# Patient Record
Sex: FEMALE | Race: WHITE | NOT HISPANIC OR LATINO | Employment: OTHER | URBAN - METROPOLITAN AREA
[De-identification: names, ages, dates, MRNs, and addresses within clinical notes are randomized per-mention and may not be internally consistent; named-entity substitution may affect disease eponyms.]

---

## 2018-06-12 ENCOUNTER — HOSPITAL ENCOUNTER (OUTPATIENT)
Dept: OTHER | Facility: HOSPITAL | Age: 68
Setting detail: SPECIMEN
Discharge: HOME | End: 2018-06-12
Attending: INTERNAL MEDICINE | Admitting: INTERNAL MEDICINE

## 2018-06-12 ENCOUNTER — ON CAMPUS - OUTPATIENT (AMBULATORY)
Dept: URBAN - METROPOLITAN AREA HOSPITAL 2 | Facility: HOSPITAL | Age: 68
End: 2018-06-12
Payer: OTHER GOVERNMENT

## 2018-06-12 ENCOUNTER — OFFICE (AMBULATORY)
Dept: URBAN - METROPOLITAN AREA PATHOLOGY 4 | Facility: PATHOLOGY | Age: 68
End: 2018-06-12
Payer: OTHER GOVERNMENT

## 2018-06-12 VITALS
DIASTOLIC BLOOD PRESSURE: 72 MMHG | WEIGHT: 195 LBS | HEART RATE: 73 BPM | SYSTOLIC BLOOD PRESSURE: 158 MMHG | TEMPERATURE: 97.5 F | RESPIRATION RATE: 15 BRPM | DIASTOLIC BLOOD PRESSURE: 63 MMHG | HEART RATE: 76 BPM | SYSTOLIC BLOOD PRESSURE: 131 MMHG | DIASTOLIC BLOOD PRESSURE: 79 MMHG | SYSTOLIC BLOOD PRESSURE: 148 MMHG | SYSTOLIC BLOOD PRESSURE: 127 MMHG | HEART RATE: 75 BPM | DIASTOLIC BLOOD PRESSURE: 64 MMHG | OXYGEN SATURATION: 100 % | SYSTOLIC BLOOD PRESSURE: 138 MMHG | RESPIRATION RATE: 16 BRPM | SYSTOLIC BLOOD PRESSURE: 124 MMHG | OXYGEN SATURATION: 99 % | OXYGEN SATURATION: 96 % | OXYGEN SATURATION: 88 % | SYSTOLIC BLOOD PRESSURE: 151 MMHG | SYSTOLIC BLOOD PRESSURE: 135 MMHG | DIASTOLIC BLOOD PRESSURE: 69 MMHG | HEART RATE: 74 BPM | HEART RATE: 77 BPM | HEART RATE: 78 BPM | DIASTOLIC BLOOD PRESSURE: 73 MMHG | HEIGHT: 65 IN | SYSTOLIC BLOOD PRESSURE: 143 MMHG | DIASTOLIC BLOOD PRESSURE: 67 MMHG | SYSTOLIC BLOOD PRESSURE: 160 MMHG | OXYGEN SATURATION: 94 % | OXYGEN SATURATION: 98 % | DIASTOLIC BLOOD PRESSURE: 74 MMHG

## 2018-06-12 DIAGNOSIS — D12.2 BENIGN NEOPLASM OF ASCENDING COLON: ICD-10-CM

## 2018-06-12 DIAGNOSIS — R11.0 NAUSEA: ICD-10-CM

## 2018-06-12 DIAGNOSIS — Z12.11 ENCOUNTER FOR SCREENING FOR MALIGNANT NEOPLASM OF COLON: ICD-10-CM

## 2018-06-12 DIAGNOSIS — K21.9 GASTRO-ESOPHAGEAL REFLUX DISEASE WITHOUT ESOPHAGITIS: ICD-10-CM

## 2018-06-12 LAB
GI HISTOLOGY: A. UNSPECIFIED: (no result)
GI HISTOLOGY: PDF REPORT: (no result)

## 2018-06-12 PROCEDURE — 43235 EGD DIAGNOSTIC BRUSH WASH: CPT | Mod: 59 | Performed by: INTERNAL MEDICINE

## 2018-06-12 PROCEDURE — 88305 TISSUE EXAM BY PATHOLOGIST: CPT | Mod: 26 | Performed by: INTERNAL MEDICINE

## 2018-06-12 PROCEDURE — 45385 COLONOSCOPY W/LESION REMOVAL: CPT | Mod: PT | Performed by: INTERNAL MEDICINE

## 2018-06-12 RX ADMIN — PROPOFOL: 10 INJECTION, EMULSION INTRAVENOUS at 08:33

## 2020-02-09 ENCOUNTER — HOSPITAL ENCOUNTER (EMERGENCY)
Facility: HOSPITAL | Age: 70
Discharge: HOME | End: 2020-02-09
Attending: EMERGENCY MEDICINE | Admitting: EMERGENCY MEDICINE

## 2020-02-09 ENCOUNTER — APPOINTMENT (OUTPATIENT)
Dept: GENERAL RADIOLOGY | Facility: HOSPITAL | Age: 70
End: 2020-02-09

## 2020-02-09 VITALS
BODY MASS INDEX: 34.27 KG/M2 | HEIGHT: 65 IN | HEART RATE: 102 BPM | TEMPERATURE: 98.5 F | WEIGHT: 205.69 LBS | RESPIRATION RATE: 19 BRPM | SYSTOLIC BLOOD PRESSURE: 158 MMHG | OXYGEN SATURATION: 94 % | DIASTOLIC BLOOD PRESSURE: 59 MMHG

## 2020-02-09 DIAGNOSIS — J11.1 INFLUENZA: Primary | ICD-10-CM

## 2020-02-09 LAB
ALBUMIN SERPL-MCNC: 3.9 G/DL (ref 3.5–5.2)
ALBUMIN/GLOB SERPL: 1.4 G/DL
ALP SERPL-CCNC: 129 U/L (ref 39–117)
ALT SERPL W P-5'-P-CCNC: 58 U/L (ref 1–33)
ANION GAP SERPL CALCULATED.3IONS-SCNC: 12 MMOL/L (ref 5–15)
AST SERPL-CCNC: 46 U/L (ref 1–32)
BASOPHILS # BLD AUTO: 0.1 10*3/MM3 (ref 0–0.2)
BASOPHILS NFR BLD AUTO: 1.7 % (ref 0–1.5)
BILIRUB SERPL-MCNC: 0.5 MG/DL (ref 0.2–1.2)
BUN BLD-MCNC: 10 MG/DL (ref 8–23)
BUN/CREAT SERPL: 8.9 (ref 7–25)
CALCIUM SPEC-SCNC: 8.8 MG/DL (ref 8.6–10.5)
CHLORIDE SERPL-SCNC: 100 MMOL/L (ref 98–107)
CO2 SERPL-SCNC: 24 MMOL/L (ref 22–29)
CREAT BLD-MCNC: 1.12 MG/DL (ref 0.57–1)
D-LACTATE SERPL-SCNC: 0.9 MMOL/L (ref 0.5–2)
DEPRECATED RDW RBC AUTO: 50.8 FL (ref 37–54)
EOSINOPHIL # BLD AUTO: 0.1 10*3/MM3 (ref 0–0.4)
EOSINOPHIL NFR BLD AUTO: 1.1 % (ref 0.3–6.2)
ERYTHROCYTE [DISTWIDTH] IN BLOOD BY AUTOMATED COUNT: 16.2 % (ref 12.3–15.4)
FLUAV SUBTYP SPEC NAA+PROBE: DETECTED
FLUBV RNA ISLT QL NAA+PROBE: NOT DETECTED
GFR SERPL CREATININE-BSD FRML MDRD: 48 ML/MIN/1.73
GLOBULIN UR ELPH-MCNC: 2.7 GM/DL
GLUCOSE BLD-MCNC: 119 MG/DL (ref 65–99)
HCT VFR BLD AUTO: 36.3 % (ref 34–46.6)
HGB BLD-MCNC: 12.1 G/DL (ref 12–15.9)
LYMPHOCYTES # BLD AUTO: 0.5 10*3/MM3 (ref 0.7–3.1)
LYMPHOCYTES NFR BLD AUTO: 8.9 % (ref 19.6–45.3)
MCH RBC QN AUTO: 29.5 PG (ref 26.6–33)
MCHC RBC AUTO-ENTMCNC: 33.4 G/DL (ref 31.5–35.7)
MCV RBC AUTO: 88.4 FL (ref 79–97)
MONOCYTES # BLD AUTO: 0.6 10*3/MM3 (ref 0.1–0.9)
MONOCYTES NFR BLD AUTO: 10 % (ref 5–12)
NEUTROPHILS # BLD AUTO: 4.3 10*3/MM3 (ref 1.7–7)
NEUTROPHILS NFR BLD AUTO: 78.3 % (ref 42.7–76)
NRBC BLD AUTO-RTO: 0.2 /100 WBC (ref 0–0.2)
PLATELET # BLD AUTO: 93 10*3/MM3 (ref 140–450)
PMV BLD AUTO: 7.4 FL (ref 6–12)
POTASSIUM BLD-SCNC: 4.2 MMOL/L (ref 3.5–5.2)
PROT SERPL-MCNC: 6.6 G/DL (ref 6–8.5)
RBC # BLD AUTO: 4.11 10*6/MM3 (ref 3.77–5.28)
RBC MORPH BLD: NORMAL
SMALL PLATELETS BLD QL SMEAR: ADEQUATE
SODIUM BLD-SCNC: 136 MMOL/L (ref 136–145)
WBC MORPH BLD: NORMAL
WBC NRBC COR # BLD: 5.5 10*3/MM3 (ref 3.4–10.8)

## 2020-02-09 PROCEDURE — 80053 COMPREHEN METABOLIC PANEL: CPT | Performed by: EMERGENCY MEDICINE

## 2020-02-09 PROCEDURE — 99285 EMERGENCY DEPT VISIT HI MDM: CPT

## 2020-02-09 PROCEDURE — 83605 ASSAY OF LACTIC ACID: CPT

## 2020-02-09 PROCEDURE — 71045 X-RAY EXAM CHEST 1 VIEW: CPT

## 2020-02-09 PROCEDURE — 87040 BLOOD CULTURE FOR BACTERIA: CPT | Performed by: EMERGENCY MEDICINE

## 2020-02-09 PROCEDURE — 36415 COLL VENOUS BLD VENIPUNCTURE: CPT

## 2020-02-09 PROCEDURE — 85025 COMPLETE CBC W/AUTO DIFF WBC: CPT | Performed by: EMERGENCY MEDICINE

## 2020-02-09 PROCEDURE — 87502 INFLUENZA DNA AMP PROBE: CPT | Performed by: EMERGENCY MEDICINE

## 2020-02-09 PROCEDURE — 85007 BL SMEAR W/DIFF WBC COUNT: CPT | Performed by: EMERGENCY MEDICINE

## 2020-02-09 RX ORDER — FEBUXOSTAT 80 MG/1
TABLET, FILM COATED ORAL DAILY
COMMUNITY

## 2020-02-09 RX ORDER — EZETIMIBE 10 MG/1
10 TABLET ORAL DAILY
COMMUNITY

## 2020-02-09 RX ORDER — BUMETANIDE 2 MG/1
2 TABLET ORAL DAILY
COMMUNITY

## 2020-02-09 RX ORDER — ALLOPURINOL 300 MG/1
300 TABLET ORAL DAILY
COMMUNITY

## 2020-02-09 RX ORDER — GLIPIZIDE 10 MG/1
20 TABLET ORAL
COMMUNITY

## 2020-02-09 RX ORDER — LINEZOLID 600 MG/1
600 TABLET, FILM COATED ORAL 2 TIMES DAILY
COMMUNITY
Start: 2020-01-22 | End: 2020-06-10

## 2020-02-09 RX ORDER — SODIUM CHLORIDE 0.9 % (FLUSH) 0.9 %
10 SYRINGE (ML) INJECTION AS NEEDED
Status: DISCONTINUED | OUTPATIENT
Start: 2020-02-09 | End: 2020-02-09 | Stop reason: HOSPADM

## 2020-02-09 RX ORDER — ASPIRIN 81 MG/1
81 TABLET ORAL 3 TIMES WEEKLY
COMMUNITY

## 2020-02-09 RX ORDER — ACETAMINOPHEN 500 MG
1000 TABLET ORAL ONCE
Status: COMPLETED | OUTPATIENT
Start: 2020-02-09 | End: 2020-02-09

## 2020-02-09 RX ORDER — RABEPRAZOLE SODIUM 20 MG/1
20 TABLET, DELAYED RELEASE ORAL DAILY
COMMUNITY

## 2020-02-09 RX ORDER — ONDANSETRON 4 MG/1
4 TABLET, ORALLY DISINTEGRATING ORAL ONCE
Status: COMPLETED | OUTPATIENT
Start: 2020-02-09 | End: 2020-02-09

## 2020-02-09 RX ORDER — OSELTAMIVIR PHOSPHATE 75 MG/1
75 CAPSULE ORAL 2 TIMES DAILY
Qty: 10 CAPSULE | Refills: 0 | Status: SHIPPED | OUTPATIENT
Start: 2020-02-09 | End: 2020-06-10

## 2020-02-09 RX ORDER — FOLIC ACID 1 MG/1
1 TABLET ORAL DAILY
COMMUNITY

## 2020-02-09 RX ORDER — METOPROLOL TARTRATE 50 MG/1
50 TABLET, FILM COATED ORAL 2 TIMES DAILY
COMMUNITY

## 2020-02-09 RX ORDER — ROPINIROLE 2 MG/1
2 TABLET, FILM COATED ORAL 3 TIMES DAILY
COMMUNITY

## 2020-02-09 RX ADMIN — ONDANSETRON 4 MG: 4 TABLET, ORALLY DISINTEGRATING ORAL at 09:41

## 2020-02-09 RX ADMIN — SODIUM CHLORIDE 1000 ML: 900 INJECTION, SOLUTION INTRAVENOUS at 09:25

## 2020-02-09 RX ADMIN — Medication 10 ML: at 09:25

## 2020-02-09 RX ADMIN — ACETAMINOPHEN 1000 MG: 500 TABLET, FILM COATED ORAL at 09:41

## 2020-02-09 NOTE — DISCHARGE INSTRUCTIONS
Return for increased pain fever vomiting, shortness of air or any other concerns. See your doctor this week for follow up of blood cultures and reexamination as well as borderline elevated liver enzymes.  Rest, drink plenty of fluids

## 2020-02-09 NOTE — ED PROVIDER NOTES
Subjective   History of Present Illness  Fever, cough and congestion   69-year-old female complains of achiness fever cough over the last 2 days.  Cough is nonproductive.  She has had headache and muscle aches.  States she has had a slight scratchy sore throat.  She denies shortness of breath she has had some nausea.  She states she has had a few loose stools.  She reports no known ill contacts.  States she has been on Zyvox for a staph infection on her face that has now cleared up.  She denies abdominal pain or dysuria  Review of Systems   Constitutional: Positive for fever.   HENT: Positive for congestion and sore throat.    Eyes: Negative.    Respiratory: Positive for cough. Negative for shortness of breath.    Cardiovascular: Negative.    Gastrointestinal: Positive for abdominal pain, diarrhea and nausea.   Genitourinary: Negative.    Musculoskeletal: Positive for myalgias.   Skin: Negative.    Neurological: Positive for headaches. Negative for weakness and numbness.   Hematological: Negative.    Psychiatric/Behavioral: Negative.        Past Medical History:   Diagnosis Date   • Gout    • Hypertension        No Known Allergies    Past Surgical History:   Procedure Laterality Date   • CHOLECYSTECTOMY     • HYSTERECTOMY     • TONSILLECTOMY         History reviewed. No pertinent family history.    Social History     Socioeconomic History   • Marital status:      Spouse name: Not on file   • Number of children: Not on file   • Years of education: Not on file   • Highest education level: Not on file   Tobacco Use   • Smoking status: Never Smoker   Substance and Sexual Activity   • Alcohol use: Never     Frequency: Never   • Drug use: Never     Prior to Admission medications    Medication Sig Start Date End Date Taking? Authorizing Provider   allopurinol (ZYLOPRIM) 300 MG tablet Take 300 mg by mouth Daily.   Yes Provider, MD Attila   aspirin 81 MG EC tablet Take 81 mg by mouth 3 (Three) Times a Week.   Yes  "Attila Pope MD   bumetanide (BUMEX) 2 MG tablet Take 2 mg by mouth Daily.   Yes Attila Pope MD   ezetimibe (ZETIA) 10 MG tablet Take 10 mg by mouth Daily.   Yes Attila Pope MD   febuxostat (ULORIC) 80 MG tablet tablet Take  by mouth Daily.   Yes Attila Pope MD   folic acid (FOLVITE) 1 MG tablet Take 1 mg by mouth 2 (Two) Times a Day.   Yes Attila Pope MD   glipizide (GLUCOTROL) 10 MG tablet Take 20 mg by mouth 2 (Two) Times a Day Before Meals.   Yes Attila Pope MD   linezolid (ZYVOX) 600 MG tablet Take 600 mg by mouth 2 (Two) Times a Day. 1/22/20  Yes Attila Pope MD   metoprolol tartrate (LOPRESSOR) 50 MG tablet Take 50 mg by mouth 2 (Two) Times a Day.   Yes Attila Pope MD   RABEprazole (ACIPHEX) 20 MG EC tablet Take 20 mg by mouth Daily.   Yes Attila Pope MD   rOPINIRole (REQUIP) 2 MG tablet Take 2 mg by mouth 3 (Three) Times a Day.   Yes Attila Pope MD       BP (!) 182/75   Pulse 107   Temp 100 °F (37.8 °C) (Oral)   Resp 20   Ht 165.1 cm (65\")   Wt 93.3 kg (205 lb 11 oz)   SpO2 93%   BMI 34.23 kg/m²       Objective   Physical Exam  General: Well-developed well-appearing, no acute distress, alert and appropriate  Eyes: Pupils round and reactive, sclera nonicteric  HEENT: Mucous membranes somewhat dry, no mucosal swelling, tympanic membranes clear bilaterally  Neck: Supple, no nuchal rigidity, no lymphadenopathy  Respirations: Respirations nonlabored, equal breath sounds bilaterally, clear lungs  Heart regular rate and rhythm, no murmurs rubs or gallops,   Abdomen soft nontender nondistended, no hepatosplenomegaly, no hernia, no mass, normal bowel sounds, no CVA tenderness  Extremities no clubbing cyanosis or edema, calves are symmetric and nontender  Neuro cranial nerves grossly intact, no focal limb deficits  Psych oriented, pleasant affect  Skin no rash, brisk cap refill  Procedures           ED Course  ED " Course as of Feb 09 1026   Sun Feb 09, 2020   1019 POC Lactate [SH]   1020 POC Lactate [SH]      ED Course User Index  [SH] Osman Irving MD            Results for orders placed or performed during the hospital encounter of 02/09/20   Influenza Antigen, Rapid - Swab, Nasopharynx   Result Value Ref Range    Influenza A PCR Detected (A) Not Detected    Influenza B PCR Not Detected Not Detected   Comprehensive Metabolic Panel   Result Value Ref Range    Glucose 119 (H) 65 - 99 mg/dL    BUN 10 8 - 23 mg/dL    Creatinine 1.12 (H) 0.57 - 1.00 mg/dL    Sodium 136 136 - 145 mmol/L    Potassium 4.2 3.5 - 5.2 mmol/L    Chloride 100 98 - 107 mmol/L    CO2 24.0 22.0 - 29.0 mmol/L    Calcium 8.8 8.6 - 10.5 mg/dL    Total Protein 6.6 6.0 - 8.5 g/dL    Albumin 3.90 3.50 - 5.20 g/dL    ALT (SGPT) 58 (H) 1 - 33 U/L    AST (SGOT) 46 (H) 1 - 32 U/L    Alkaline Phosphatase 129 (H) 39 - 117 U/L    Total Bilirubin 0.5 0.2 - 1.2 mg/dL    eGFR Non African Amer 48 (L) >60 mL/min/1.73    Globulin 2.7 gm/dL    A/G Ratio 1.4 g/dL    BUN/Creatinine Ratio 8.9 7.0 - 25.0    Anion Gap 12.0 5.0 - 15.0 mmol/L   CBC Auto Differential   Result Value Ref Range    WBC 5.50 3.40 - 10.80 10*3/mm3    RBC 4.11 3.77 - 5.28 10*6/mm3    Hemoglobin 12.1 12.0 - 15.9 g/dL    Hematocrit 36.3 34.0 - 46.6 %    MCV 88.4 79.0 - 97.0 fL    MCH 29.5 26.6 - 33.0 pg    MCHC 33.4 31.5 - 35.7 g/dL    RDW 16.2 (H) 12.3 - 15.4 %    RDW-SD 50.8 37.0 - 54.0 fl    MPV 7.4 6.0 - 12.0 fL    Platelets 93 (L) 140 - 450 10*3/mm3    Neutrophil % 78.3 (H) 42.7 - 76.0 %    Lymphocyte % 8.9 (L) 19.6 - 45.3 %    Monocyte % 10.0 5.0 - 12.0 %    Eosinophil % 1.1 0.3 - 6.2 %    Basophil % 1.7 (H) 0.0 - 1.5 %    Neutrophils, Absolute 4.30 1.70 - 7.00 10*3/mm3    Lymphocytes, Absolute 0.50 (L) 0.70 - 3.10 10*3/mm3    Monocytes, Absolute 0.60 0.10 - 0.90 10*3/mm3    Eosinophils, Absolute 0.10 0.00 - 0.40 10*3/mm3    Basophils, Absolute 0.10 0.00 - 0.20 10*3/mm3    nRBC 0.2 0.0 - 0.2 /100  WBC   Scan Slide   Result Value Ref Range    RBC Morphology Normal Normal    WBC Morphology Normal Normal    Platelet Estimate Adequate Normal   POC Lactate   Result Value Ref Range    Lactate 0.9 0.5 - 2.0 mmol/L     Xr Chest 1 View    Result Date: 2/9/2020   1. There is no obvious pneumonia or congestive change.   Electronically Signed By-Kimo Kee On:2/9/2020 9:46 AM This report was finalized on [id number] by  Kimo Kee, .                                          MDM  Patient presents with fever and myalgias.  She was positive for influenza.  Lactic acid is normal.  She did appear somewhat dehydrated was given some IV fluids.  She is not in any respiratory distress.  She does have some borderline elevated liver enzymes of unknown chronicity.  She is to follow-up with her primary care physician and given warning signs for return.  Notably she has a benign abdominal examination with no signs of peritonitis or acute abdomen.  She is prescribed Tamiflu for influenza.  Final diagnoses:   Influenza            Osman Irving MD  02/09/20 1027

## 2020-02-14 LAB
BACTERIA SPEC AEROBE CULT: NORMAL
BACTERIA SPEC AEROBE CULT: NORMAL

## 2020-06-10 ENCOUNTER — HOSPITAL ENCOUNTER (OUTPATIENT)
Facility: HOSPITAL | Age: 70
Setting detail: OBSERVATION
Discharge: HOME | End: 2020-06-14
Attending: EMERGENCY MEDICINE | Admitting: HOSPITALIST

## 2020-06-10 DIAGNOSIS — L02.214 ABSCESS OF GROIN, LEFT: Primary | ICD-10-CM

## 2020-06-10 LAB
ALBUMIN SERPL-MCNC: 4 G/DL (ref 3.5–5.2)
ALBUMIN/GLOB SERPL: 1.2 G/DL
ALP SERPL-CCNC: 155 U/L (ref 39–117)
ALT SERPL W P-5'-P-CCNC: 37 U/L (ref 1–33)
ANION GAP SERPL CALCULATED.3IONS-SCNC: 12 MMOL/L (ref 5–15)
AST SERPL-CCNC: 27 U/L (ref 1–32)
BACTERIA UR QL AUTO: ABNORMAL /HPF
BASOPHILS # BLD AUTO: 0.1 10*3/MM3 (ref 0–0.2)
BASOPHILS NFR BLD AUTO: 1.1 % (ref 0–1.5)
BILIRUB SERPL-MCNC: 0.4 MG/DL (ref 0.2–1.2)
BILIRUB UR QL STRIP: NEGATIVE
BILIRUB UR QL STRIP: NEGATIVE
BUN BLD-MCNC: 25 MG/DL (ref 8–23)
BUN BLD-MCNC: ABNORMAL MG/DL
BUN/CREAT SERPL: ABNORMAL
CALCIUM SPEC-SCNC: 9.3 MG/DL (ref 8.6–10.5)
CHLORIDE SERPL-SCNC: 102 MMOL/L (ref 98–107)
CLARITY UR: ABNORMAL
CLARITY UR: CLEAR
CO2 SERPL-SCNC: 22 MMOL/L (ref 22–29)
COLOR UR: ABNORMAL
COLOR UR: ABNORMAL
CREAT BLD-MCNC: 1.46 MG/DL (ref 0.57–1)
D-LACTATE SERPL-SCNC: 0.8 MMOL/L (ref 0.5–2)
DEPRECATED RDW RBC AUTO: 46.8 FL (ref 37–54)
EOSINOPHIL # BLD AUTO: 0.3 10*3/MM3 (ref 0–0.4)
EOSINOPHIL NFR BLD AUTO: 2.5 % (ref 0.3–6.2)
ERYTHROCYTE [DISTWIDTH] IN BLOOD BY AUTOMATED COUNT: 15.1 % (ref 12.3–15.4)
GFR SERPL CREATININE-BSD FRML MDRD: 36 ML/MIN/1.73
GLOBULIN UR ELPH-MCNC: 3.3 GM/DL
GLUCOSE BLD-MCNC: 111 MG/DL (ref 65–99)
GLUCOSE BLDC GLUCOMTR-MCNC: 148 MG/DL (ref 70–105)
GLUCOSE BLDC GLUCOMTR-MCNC: 190 MG/DL (ref 70–105)
GLUCOSE BLDC GLUCOMTR-MCNC: 88 MG/DL (ref 70–105)
GLUCOSE BLDC GLUCOMTR-MCNC: 99 MG/DL (ref 70–105)
GLUCOSE UR STRIP-MCNC: NEGATIVE MG/DL
GLUCOSE UR STRIP-MCNC: NEGATIVE MG/DL
HCT VFR BLD AUTO: 38.1 % (ref 34–46.6)
HGB BLD-MCNC: 12.6 G/DL (ref 12–15.9)
HGB UR QL STRIP.AUTO: ABNORMAL
HGB UR QL STRIP.AUTO: NEGATIVE
HOLD SPECIMEN: NORMAL
HOLD SPECIMEN: NORMAL
HYALINE CASTS UR QL AUTO: ABNORMAL /LPF
KETONES UR QL STRIP: ABNORMAL
KETONES UR QL STRIP: NEGATIVE
LEUKOCYTE ESTERASE UR QL STRIP.AUTO: ABNORMAL
LEUKOCYTE ESTERASE UR QL STRIP.AUTO: NEGATIVE
LYMPHOCYTES # BLD AUTO: 1.1 10*3/MM3 (ref 0.7–3.1)
LYMPHOCYTES NFR BLD AUTO: 8.2 % (ref 19.6–45.3)
MCH RBC QN AUTO: 29.4 PG (ref 26.6–33)
MCHC RBC AUTO-ENTMCNC: 33.1 G/DL (ref 31.5–35.7)
MCV RBC AUTO: 88.9 FL (ref 79–97)
MONOCYTES # BLD AUTO: 0.7 10*3/MM3 (ref 0.1–0.9)
MONOCYTES NFR BLD AUTO: 5.2 % (ref 5–12)
NEUTROPHILS # BLD AUTO: 10.8 10*3/MM3 (ref 1.7–7)
NEUTROPHILS NFR BLD AUTO: 83 % (ref 42.7–76)
NITRITE UR QL STRIP: NEGATIVE
NITRITE UR QL STRIP: NEGATIVE
NRBC BLD AUTO-RTO: 0 /100 WBC (ref 0–0.2)
PH UR STRIP.AUTO: 5.5 [PH] (ref 5–8)
PH UR STRIP.AUTO: 5.5 [PH] (ref 5–8)
PLATELET # BLD AUTO: 228 10*3/MM3 (ref 140–450)
PMV BLD AUTO: 8.7 FL (ref 6–12)
POTASSIUM BLD-SCNC: 3.7 MMOL/L (ref 3.5–5.2)
PROT SERPL-MCNC: 7.3 G/DL (ref 6–8.5)
PROT UR QL STRIP: ABNORMAL
PROT UR QL STRIP: ABNORMAL
RBC # BLD AUTO: 4.28 10*6/MM3 (ref 3.77–5.28)
RBC # UR: ABNORMAL /HPF
REF LAB TEST METHOD: ABNORMAL
SARS-COV-2 RNA PNL SPEC NAA+PROBE: NOT DETECTED
SODIUM BLD-SCNC: 136 MMOL/L (ref 136–145)
SP GR UR STRIP: 1.03 (ref 1–1.03)
SP GR UR STRIP: >=1.03 (ref 1–1.03)
SQUAMOUS #/AREA URNS HPF: ABNORMAL /HPF
UROBILINOGEN UR QL STRIP: ABNORMAL
UROBILINOGEN UR QL STRIP: ABNORMAL
WBC NRBC COR # BLD: 13 10*3/MM3 (ref 3.4–10.8)
WBC UR QL AUTO: ABNORMAL /HPF
WHOLE BLOOD HOLD SPECIMEN: NORMAL
WHOLE BLOOD HOLD SPECIMEN: NORMAL

## 2020-06-10 PROCEDURE — 85025 COMPLETE CBC W/AUTO DIFF WBC: CPT | Performed by: EMERGENCY MEDICINE

## 2020-06-10 PROCEDURE — P9612 CATHETERIZE FOR URINE SPEC: HCPCS

## 2020-06-10 PROCEDURE — 87070 CULTURE OTHR SPECIMN AEROBIC: CPT | Performed by: EMERGENCY MEDICINE

## 2020-06-10 PROCEDURE — 25010000003 AMPICILLIN-SULBACTAM PER 1.5 G: Performed by: NURSE PRACTITIONER

## 2020-06-10 PROCEDURE — 83036 HEMOGLOBIN GLYCOSYLATED A1C: CPT | Performed by: NURSE PRACTITIONER

## 2020-06-10 PROCEDURE — 87086 URINE CULTURE/COLONY COUNT: CPT | Performed by: EMERGENCY MEDICINE

## 2020-06-10 PROCEDURE — 87147 CULTURE TYPE IMMUNOLOGIC: CPT | Performed by: EMERGENCY MEDICINE

## 2020-06-10 PROCEDURE — 99220 PR INITIAL OBSERVATION CARE/DAY 70 MINUTES: CPT | Performed by: HOSPITALIST

## 2020-06-10 PROCEDURE — 96361 HYDRATE IV INFUSION ADD-ON: CPT

## 2020-06-10 PROCEDURE — 87040 BLOOD CULTURE FOR BACTERIA: CPT | Performed by: EMERGENCY MEDICINE

## 2020-06-10 PROCEDURE — 87186 SC STD MICRODIL/AGAR DIL: CPT | Performed by: EMERGENCY MEDICINE

## 2020-06-10 PROCEDURE — 87635 SARS-COV-2 COVID-19 AMP PRB: CPT | Performed by: EMERGENCY MEDICINE

## 2020-06-10 PROCEDURE — 63710000001 INSULIN LISPRO (HUMAN) PER 5 UNITS: Performed by: NURSE PRACTITIONER

## 2020-06-10 PROCEDURE — 36415 COLL VENOUS BLD VENIPUNCTURE: CPT

## 2020-06-10 PROCEDURE — G0378 HOSPITAL OBSERVATION PER HR: HCPCS

## 2020-06-10 PROCEDURE — 99284 EMERGENCY DEPT VISIT MOD MDM: CPT

## 2020-06-10 PROCEDURE — 96365 THER/PROPH/DIAG IV INF INIT: CPT

## 2020-06-10 PROCEDURE — 82962 GLUCOSE BLOOD TEST: CPT

## 2020-06-10 PROCEDURE — 80053 COMPREHEN METABOLIC PANEL: CPT | Performed by: EMERGENCY MEDICINE

## 2020-06-10 PROCEDURE — 96367 TX/PROPH/DG ADDL SEQ IV INF: CPT

## 2020-06-10 PROCEDURE — 96366 THER/PROPH/DIAG IV INF ADDON: CPT

## 2020-06-10 PROCEDURE — 87205 SMEAR GRAM STAIN: CPT | Performed by: EMERGENCY MEDICINE

## 2020-06-10 PROCEDURE — 83605 ASSAY OF LACTIC ACID: CPT

## 2020-06-10 PROCEDURE — 25010000002 VANCOMYCIN 10 G RECONSTITUTED SOLUTION: Performed by: EMERGENCY MEDICINE

## 2020-06-10 PROCEDURE — 81001 URINALYSIS AUTO W/SCOPE: CPT | Performed by: EMERGENCY MEDICINE

## 2020-06-10 PROCEDURE — 81003 URINALYSIS AUTO W/O SCOPE: CPT | Performed by: EMERGENCY MEDICINE

## 2020-06-10 RX ORDER — NICOTINE POLACRILEX 4 MG
15 LOZENGE BUCCAL
Status: DISCONTINUED | OUTPATIENT
Start: 2020-06-10 | End: 2020-06-14 | Stop reason: HOSPADM

## 2020-06-10 RX ORDER — SODIUM CHLORIDE 9 MG/ML
100 INJECTION, SOLUTION INTRAVENOUS CONTINUOUS
Status: DISCONTINUED | OUTPATIENT
Start: 2020-06-10 | End: 2020-06-14 | Stop reason: HOSPADM

## 2020-06-10 RX ORDER — SODIUM CHLORIDE 0.9 % (FLUSH) 0.9 %
10 SYRINGE (ML) INJECTION EVERY 12 HOURS SCHEDULED
Status: DISCONTINUED | OUTPATIENT
Start: 2020-06-10 | End: 2020-06-14 | Stop reason: HOSPADM

## 2020-06-10 RX ORDER — BUMETANIDE 1 MG/1
2 TABLET ORAL DAILY
Status: DISCONTINUED | OUTPATIENT
Start: 2020-06-10 | End: 2020-06-14 | Stop reason: HOSPADM

## 2020-06-10 RX ORDER — FOLIC ACID 1 MG/1
1 TABLET ORAL DAILY
Status: DISCONTINUED | OUTPATIENT
Start: 2020-06-10 | End: 2020-06-14 | Stop reason: HOSPADM

## 2020-06-10 RX ORDER — SODIUM CHLORIDE 0.9 % (FLUSH) 0.9 %
10 SYRINGE (ML) INJECTION AS NEEDED
Status: DISCONTINUED | OUTPATIENT
Start: 2020-06-10 | End: 2020-06-14 | Stop reason: HOSPADM

## 2020-06-10 RX ORDER — NITROGLYCERIN 0.4 MG/1
0.4 TABLET SUBLINGUAL
Status: DISCONTINUED | OUTPATIENT
Start: 2020-06-10 | End: 2020-06-14 | Stop reason: HOSPADM

## 2020-06-10 RX ORDER — ACETAMINOPHEN 650 MG/1
650 SUPPOSITORY RECTAL EVERY 4 HOURS PRN
Status: DISCONTINUED | OUTPATIENT
Start: 2020-06-10 | End: 2020-06-14 | Stop reason: HOSPADM

## 2020-06-10 RX ORDER — METOPROLOL TARTRATE 50 MG/1
50 TABLET, FILM COATED ORAL EVERY 12 HOURS SCHEDULED
Status: DISCONTINUED | OUTPATIENT
Start: 2020-06-10 | End: 2020-06-14 | Stop reason: HOSPADM

## 2020-06-10 RX ORDER — FEBUXOSTAT 40 MG/1
80 TABLET, FILM COATED ORAL DAILY
Status: DISCONTINUED | OUTPATIENT
Start: 2020-06-10 | End: 2020-06-14 | Stop reason: HOSPADM

## 2020-06-10 RX ORDER — CHOLECALCIFEROL (VITAMIN D3) 125 MCG
5 CAPSULE ORAL NIGHTLY PRN
Status: DISCONTINUED | OUTPATIENT
Start: 2020-06-10 | End: 2020-06-14 | Stop reason: HOSPADM

## 2020-06-10 RX ORDER — PANTOPRAZOLE SODIUM 40 MG/1
40 TABLET, DELAYED RELEASE ORAL EVERY MORNING
Status: DISCONTINUED | OUTPATIENT
Start: 2020-06-10 | End: 2020-06-14 | Stop reason: HOSPADM

## 2020-06-10 RX ORDER — ONDANSETRON 2 MG/ML
4 INJECTION INTRAMUSCULAR; INTRAVENOUS EVERY 6 HOURS PRN
Status: DISCONTINUED | OUTPATIENT
Start: 2020-06-10 | End: 2020-06-14 | Stop reason: HOSPADM

## 2020-06-10 RX ORDER — ROPINIROLE 1 MG/1
2 TABLET, FILM COATED ORAL 3 TIMES DAILY
Status: DISCONTINUED | OUTPATIENT
Start: 2020-06-10 | End: 2020-06-14 | Stop reason: HOSPADM

## 2020-06-10 RX ORDER — VANCOMYCIN HYDROCHLORIDE
20 ONCE
Status: COMPLETED | OUTPATIENT
Start: 2020-06-10 | End: 2020-06-10

## 2020-06-10 RX ORDER — ACETAMINOPHEN 325 MG/1
650 TABLET ORAL EVERY 4 HOURS PRN
Status: DISCONTINUED | OUTPATIENT
Start: 2020-06-10 | End: 2020-06-14 | Stop reason: HOSPADM

## 2020-06-10 RX ORDER — GLIPIZIDE 5 MG/1
20 TABLET ORAL
Status: DISCONTINUED | OUTPATIENT
Start: 2020-06-10 | End: 2020-06-14 | Stop reason: HOSPADM

## 2020-06-10 RX ORDER — ASPIRIN 81 MG/1
81 TABLET ORAL 3 TIMES WEEKLY
Status: DISCONTINUED | OUTPATIENT
Start: 2020-06-10 | End: 2020-06-14 | Stop reason: HOSPADM

## 2020-06-10 RX ORDER — DEXTROSE MONOHYDRATE 25 G/50ML
25 INJECTION, SOLUTION INTRAVENOUS
Status: DISCONTINUED | OUTPATIENT
Start: 2020-06-10 | End: 2020-06-14 | Stop reason: HOSPADM

## 2020-06-10 RX ORDER — ACETAMINOPHEN 160 MG/5ML
650 SOLUTION ORAL EVERY 4 HOURS PRN
Status: DISCONTINUED | OUTPATIENT
Start: 2020-06-10 | End: 2020-06-14 | Stop reason: HOSPADM

## 2020-06-10 RX ORDER — ALLOPURINOL 300 MG/1
300 TABLET ORAL DAILY
Status: DISCONTINUED | OUTPATIENT
Start: 2020-06-10 | End: 2020-06-14 | Stop reason: HOSPADM

## 2020-06-10 RX ORDER — ONDANSETRON 4 MG/1
4 TABLET, FILM COATED ORAL EVERY 6 HOURS PRN
Status: DISCONTINUED | OUTPATIENT
Start: 2020-06-10 | End: 2020-06-14 | Stop reason: HOSPADM

## 2020-06-10 RX ADMIN — SODIUM CHLORIDE 100 ML/HR: 900 INJECTION, SOLUTION INTRAVENOUS at 04:44

## 2020-06-10 RX ADMIN — AMPICILLIN SODIUM AND SULBACTAM SODIUM 1.5 G: 1; .5 INJECTION, POWDER, FOR SOLUTION INTRAMUSCULAR; INTRAVENOUS at 20:43

## 2020-06-10 RX ADMIN — ACETAMINOPHEN 650 MG: 325 TABLET, FILM COATED ORAL at 20:45

## 2020-06-10 RX ADMIN — PANTOPRAZOLE SODIUM 40 MG: 40 TABLET, DELAYED RELEASE ORAL at 20:45

## 2020-06-10 RX ADMIN — ALLOPURINOL 300 MG: 300 TABLET ORAL at 08:01

## 2020-06-10 RX ADMIN — AMPICILLIN SODIUM AND SULBACTAM SODIUM 1.5 G: 1; .5 INJECTION, POWDER, FOR SOLUTION INTRAMUSCULAR; INTRAVENOUS at 07:56

## 2020-06-10 RX ADMIN — GLIPIZIDE 20 MG: 5 TABLET ORAL at 17:45

## 2020-06-10 RX ADMIN — Medication 5000 UNITS: at 20:45

## 2020-06-10 RX ADMIN — Medication 10 ML: at 20:45

## 2020-06-10 RX ADMIN — METOPROLOL TARTRATE 50 MG: 50 TABLET, FILM COATED ORAL at 20:45

## 2020-06-10 RX ADMIN — BUMETANIDE 2 MG: 1 TABLET ORAL at 08:01

## 2020-06-10 RX ADMIN — ASPIRIN 81 MG: 81 TABLET, COATED ORAL at 08:01

## 2020-06-10 RX ADMIN — INSULIN LISPRO 2 UNITS: 100 INJECTION, SOLUTION INTRAVENOUS; SUBCUTANEOUS at 11:56

## 2020-06-10 RX ADMIN — VANCOMYCIN HYDROCHLORIDE 1500 MG: 10 INJECTION, POWDER, LYOPHILIZED, FOR SOLUTION INTRAVENOUS at 03:17

## 2020-06-10 RX ADMIN — ROPINIROLE 2 MG: 1 TABLET, FILM COATED ORAL at 20:46

## 2020-06-10 RX ADMIN — FEBUXOSTAT 80 MG: 40 TABLET ORAL at 20:46

## 2020-06-10 RX ADMIN — ROPINIROLE 2 MG: 1 TABLET, FILM COATED ORAL at 15:36

## 2020-06-10 RX ADMIN — SODIUM CHLORIDE 100 ML/HR: 900 INJECTION, SOLUTION INTRAVENOUS at 20:44

## 2020-06-10 RX ADMIN — METOPROLOL TARTRATE 50 MG: 50 TABLET, FILM COATED ORAL at 08:01

## 2020-06-10 RX ADMIN — FOLIC ACID 1 MG: 1 TABLET ORAL at 08:01

## 2020-06-10 RX ADMIN — AMPICILLIN SODIUM AND SULBACTAM SODIUM 1.5 G: 1; .5 INJECTION, POWDER, FOR SOLUTION INTRAMUSCULAR; INTRAVENOUS at 12:00

## 2020-06-10 RX ADMIN — GLIPIZIDE 20 MG: 5 TABLET ORAL at 08:01

## 2020-06-10 RX ADMIN — ACETAMINOPHEN 650 MG: 325 TABLET, FILM COATED ORAL at 08:01

## 2020-06-10 NOTE — PLAN OF CARE
No complaints. Pt states she is feeling better and swelling and pain are much better today. Will continue to monitor.   Problem: Patient Care Overview  Goal: Plan of Care Review  Outcome: Ongoing (interventions implemented as appropriate)

## 2020-06-10 NOTE — PLAN OF CARE
Problem: Patient Care Overview  Goal: Plan of Care Review  Outcome: Ongoing (interventions implemented as appropriate)  Flowsheets (Taken 6/10/2020 8590)  Progress: no change  Plan of Care Reviewed With: patient  Outcome Summary: Patient resting comfortably

## 2020-06-10 NOTE — PLAN OF CARE
Problem: Patient Care Overview  Goal: Plan of Care Review  Outcome: Ongoing (interventions implemented as appropriate)  Flowsheets (Taken 6/10/2020 1233)  Progress: no change  Plan of Care Reviewed With: patient  Outcome Summary: Pt states that she is feeling better and states that the swelling seems to be getting better  Goal: Individualization and Mutuality  Outcome: Ongoing (interventions implemented as appropriate)  Goal: Discharge Needs Assessment  Outcome: Ongoing (interventions implemented as appropriate)  Goal: Interprofessional Rounds/Family Conf  Outcome: Ongoing (interventions implemented as appropriate)     Problem: Pain, Acute (Adult)  Goal: Identify Related Risk Factors and Signs and Symptoms  Outcome: Ongoing (interventions implemented as appropriate)  Goal: Acceptable Pain Control/Comfort Level  Outcome: Ongoing (interventions implemented as appropriate)

## 2020-06-10 NOTE — H&P
[facility] Medicine Services      Patient Name: Leslee Casper  : 1950  MRN: [id number]  Primary Care Physician: Ole Miranda MD  Date of admission: 6/10/2020    Patient Care Team:  Ole Miranda MD as PCP - General (Internal Medicine)          Subjective   History Present Illness     Chief Complaint:   Chief Complaint   Patient presents with   • Fever                69 year old female presents with complaints of fever of 100.2 at home. She denies dyspnea, cough or known Covid-19 exposure. She denies dysuria . She Covid-19 tested negative here. She has been being treated for a left groin wound by her PCP and reports she was on Bactrim and Keflex and prior to that was on Zyvox for a facial cellulitis which has cleared. She has a 1.75 round wound on her left labia near her thigh crease. Small amount of drainage was cultured in ED with minimal erythema and no swelling. She reports she first noticed it Friday. She was shown an image of wound taken for records here and reports it has improved. She has a PMH of Diabetes mellitus 2 with non fasting glucose today of 111. WBC was 13,000, lactic 0.8 and trace bacteria in urine. PMh of gout, hypertension RLS. She was started on IV Vancomycin and IV Unasyn in ED and will be admitted for further evaluation and treatment.       Review of Systems   Constitution: Positive for fever.   HENT: Negative.    Eyes: Negative.    Cardiovascular: Negative.    Respiratory: Negative.    Endocrine: Negative.    Skin: Positive for poor wound healing.   Musculoskeletal: Negative.    Gastrointestinal: Negative.    Genitourinary: Negative.    Neurological: Negative.    Psychiatric/Behavioral: Negative.    Allergic/Immunologic: Negative.            Personal History     Past Medical History:   Past Medical History:   Diagnosis Date   • Diabetes (CMS/HCC)    • Gout    • Hypertension        Surgical History:      Past Surgical History:   Procedure  Laterality Date   • CHOLECYSTECTOMY     • HYSTERECTOMY     • TONSILLECTOMY             Family History: Family history is unknown by patient. Otherwise pertinent FHx was reviewed and unremarkable.     Social History:  reports that she has never smoked. She does not have any smokeless tobacco history on file. She reports that she does not drink alcohol or use drugs.      Medications:  Prior to Admission medications    Medication Sig Start Date End Date Taking? Authorizing Provider   allopurinol (ZYLOPRIM) 300 MG tablet Take 300 mg by mouth Daily.   Yes Attila Pope MD   aspirin 81 MG EC tablet Take 81 mg by mouth 3 (Three) Times a Week. Monday, Wednesday, Friday   Yes Attila Pope MD   bumetanide (BUMEX) 2 MG tablet Take 2 mg by mouth Daily.   Yes Attila Pope MD   Cholecalciferol (VITAMIN D3) 125 MCG (5000 UT) capsule capsule Take 5,000 Units by mouth 2 (two) times a day.   Yes Attila Pope MD   exenatide er (BYDUREON) 2 MG/0.85ML auto-injector injection Inject 2 mg under the skin into the appropriate area as directed 1 (One) Time Per Week. Tuesday   Yes Attila Pope MD   ezetimibe (ZETIA) 10 MG tablet Take 10 mg by mouth Daily.   Yes Attila Pope MD   febuxostat (ULORIC) 80 MG tablet tablet Take  by mouth Daily.   Yes Attila Pope MD   folic acid (FOLVITE) 1 MG tablet Take 1 mg by mouth Daily.   Yes Attila Pope MD   glipizide (GLUCOTROL) 10 MG tablet Take 20 mg by mouth 2 (Two) Times a Day Before Meals.   Yes Attila Pope MD   metoprolol tartrate (LOPRESSOR) 50 MG tablet Take 50 mg by mouth 2 (Two) Times a Day.   Yes Attila Pope MD   RABEprazole (ACIPHEX) 20 MG EC tablet Take 20 mg by mouth Daily.   Yes Attila Pope MD   rOPINIRole (REQUIP) 2 MG tablet Take 2 mg by mouth 3 (Three) Times a Day.   Yes Attila Pope MD   linezolid (ZYVOX) 600 MG tablet Take 600 mg by mouth 2 (Two) Times a Day. 1/22/20 6/10/20   Provider, MD Attila   oseltamivir (TAMIFLU) 75 MG capsule Take 1 capsule by mouth 2 (Two) Times a Day. 2/9/20 6/10/20  Osman Irving MD       Allergies:  No Known Allergies    Objective   Objective     Vital Signs  Temp:  [98.2 °F (36.8 °C)-98.9 °F (37.2 °C)] 98.2 °F (36.8 °C)  Heart Rate:  [] 91  Resp:  [16-17] 17  BP: (118-157)/(49-76) 157/76  SpO2:  [93 %-100 %] 96 %  on   ;   Device (Oxygen Therapy): room air  Body mass index is 34.96 kg/m².    Physical Exam   Constitutional: She is oriented to person, place, and time. She appears well-developed and well-nourished.   HENT:   Head: Normocephalic and atraumatic.   Eyes: EOM are normal.   Neck: Normal range of motion. Neck supple.   Cardiovascular: Normal rate, regular rhythm and normal heart sounds.   Pulmonary/Chest: Effort normal and breath sounds normal.   Abdominal: Soft. Bowel sounds are normal.   Genitourinary:       There is lesion on the left labia.   Genitourinary Comments: 1.75 cm wound to left labia with induration   Musculoskeletal: Normal range of motion.   Neurological: She is alert and oriented to person, place, and time.   Skin: Skin is warm and dry.   Psychiatric: She has a normal mood and affect. Her behavior is normal. Judgment and thought content normal.   Vitals reviewed.      Results Review:  I have personally reviewed most recent lab results and agree with findings, most notably: wbc .    Results from last 7 days   Lab Units 06/10/20  0047   WBC 10*3/mm3 13.00*   HEMOGLOBIN g/dL 12.6   HEMATOCRIT % 38.1   PLATELETS 10*3/mm3 228     Results from last 7 days   Lab Units 06/10/20  0050 06/10/20  0047   SODIUM mmol/L  --  136   POTASSIUM mmol/L  --  3.7   CHLORIDE mmol/L  --  102   CO2 mmol/L  --  22.0   BUN   --  25*   CREATININE mg/dL  --  1.46*   GLUCOSE mg/dL  --  111*   CALCIUM mg/dL  --  9.3   ALT (SGPT) U/L  --  37*   AST (SGOT) U/L  --  27   LACTATE mmol/L 0.8  --      Estimated Creatinine Clearance: 42.1 mL/min (A) (by  C-G formula based on SCr of 1.46 mg/dL (H)).  Brief Urine Lab Results  (Last result in the past 365 days)      Color   Clarity   Blood   Leuk Est   Nitrite   Protein   CREAT   Urine HCG        06/10/20 0220 Dark Yellow Clear Negative Negative Negative Trace               Microbiology Results (last 10 days)     Procedure Component Value - Date/Time    COVID-19, ABBOTT IN-HOUSE,NP Swab (NO TRANSPORT MEDIA) 2 HR TAT - Swab, Nasopharynx [[id number]]  (Normal) Collected:  06/10/20 0047    Lab Status:  Final result Specimen:  Swab from Nasopharynx Updated:  06/10/20 0124     COVID19 Not Detected          ECG/EMG Results (most recent)     None                    No radiology results for the last 7 days      Estimated Creatinine Clearance: 42.1 mL/min (A) (by C-G formula based on SCr of 1.46 mg/dL (H)).    Assessment/Plan   Assessment/Plan       Active Hospital Problems    Diagnosis  POA   • Abscess of groin, left [L02.214]  Yes      Resolved Hospital Problems   No resolved problems to display.     Left groin cellulitis and wound- with fever and leukocytosis , cultured in ED was on Keflex and Bactrim , continue IV Vancomycin pharmacy to dose and IV Unasyn, IVF's Covid-19 negative     Leukocytosis - no dyspnea or cough, UA shows trace bacteria without dysuria - on abx above     DM2 controlled with glucose 111, on Bydureon on Tuesday and glipizide , continue glipizide add ssi prn and accuchecks achs LCS diet     HTN- controlled- on Bumex, metoprolol, monitor     Gout not in exacerbation - on febustat and allopurinol     HLD- on Ezetimibe - not on pharmacy    GERD- on Aciphex- protonix here     RLS- on Ropinirole    Diet supp on folic acid Vit D3     CAD prophylaxis - denies cad - on aspirin 81 mg daily     Obesity BMI 34.9- lifestyle management education     VTE Prophylaxis -   Mechanical Order History:      Ordered        06/10/20 0411  Place Sequential Compression Device  Once         06/10/20 0411  Maintain Sequential  Compression Device  Continuous                 Pharmalogical Order History:     None          CODE STATUS:    Code Status and Medical Interventions:   Ordered at: 06/10/20 0411     Code Status:    CPR     Medical Interventions (Level of Support Prior to Arrest):    Full       This patient has been examined wearing appropriate Personal Protective Equipment . 06/10/20      I discussed the patient's findings and my recommendations with patient.        Electronically signed by BANDAR Munroe, 06/10/20, 4:12 AM.  [facility] Hospitalist Team

## 2020-06-10 NOTE — PROGRESS NOTES
Discharge Planning Assessment  [facility]     Patient Name: Leslee Casper  MRN: [id number]  Today's Date: 6/10/2020    Admit Date: 6/10/2020    Discharge Needs Assessment     Row Name 06/10/20 1105       Living Environment    Lives With  alone Spoke to patient per phone due to Covid process    Current Living Arrangements  home/apartment/condo    Primary Care Provided by  self    Able to Return to Prior Arrangements  yes       Resource/Environmental Concerns    Resource/Environmental Concerns  none    Transportation Concerns  car, none       Transition Planning    Patient/Family Anticipates Transition to  home    Patient/Family Anticipated Services at Transition  none    Transportation Anticipated  car, drives self;family or friend will provide       Discharge Needs Assessment    Readmission Within the Last 30 Days  no previous admission in last 30 days    Concerns to be Addressed  no discharge needs identified    Equipment Currently Used at Home  none    Anticipated Changes Related to Illness  none    Equipment Needed After Discharge  none        Discharge Plan     Row Name 06/10/20 1106       Plan    Plan  Routine d/c to home         PCP Brittani  Demographic Summary     Row Name 06/10/20 1105       General Information    Admission Type  observation    Arrived From  emergency department    Required Notices Provided  Observation Status Notice    Referral Source  admission list    Reason for Consult  discharge planning    Preferred Language  English        Functional Status     Row Name 06/10/20 1105       Functional Status, IADL    Medications  independent    Meal Preparation  independent    Housekeeping  independent    Laundry  independent    Shopping  independent        DC Barriers - IV ABX    Arpita Fisher RN, CM  Office Phone [phone]  Cell [phone]

## 2020-06-10 NOTE — PROGRESS NOTES
"Pharmacy Antimicrobial Dosing Service    Subjective:  Leslee Casper is a 69 y.o.female admitted with abscess of groin. Pharmacy has been consulted to dose Vancomycin for possible skin & soft tissue infection.    PMH: Been on Zyvox the past 4 days for left groin abscess with significant improvement      Assessment/Plan    1. Day #1 Vancomycin: Goal trough 10-20 mcg/mL. Pt received 1500mg (~20mg/kg DBW) IV x1, followed by 1250mg (~15mg/kg DBW) IV q24h. Vancomycin only ordered for 4 days. If continued longer, order levels prior to 4th total dose or sooner if clinically warranted.     2. Day #1 Ampicillin/sulbactam: 1.5g IV q6h for estCrCl > 30 mL/min.    Will continue to monitor drug levels, renal function, culture and sensitivities, and patient clinical status.       Objective:  Relevant clinical data and objective history reviewed:  165.1 cm (65\")   95.5 kg (210 lb 8.6 oz)   Ideal body weight: 57 kg (125 lb 10.6 oz)  Adjusted ideal body weight: 72.4 kg (159 lb 9.8 oz)  Body mass index is 35.04 kg/m².        Results from last 7 days   Lab Units 06/10/20  0047   CREATININE mg/dL 1.46*     Estimated Creatinine Clearance: 42.2 mL/min (A) (by C-G formula based on SCr of 1.46 mg/dL (H)).  No intake/output data recorded.    Results from last 7 days   Lab Units 06/10/20  0047   WBC 10*3/mm3 13.00*     Temperature    06/10/20 0004   Temp: 98.9 °F (37.2 °C)     Baseline culture/source/susceptibility:  Microbiology Results (last 10 days)       Procedure Component Value - Date/Time    COVID-19, ABBOTT IN-HOUSE,NP Swab (NO TRANSPORT MEDIA) 2 HR TAT - Swab, Nasopharynx [[id number]]  (Normal) Collected:  06/10/20 0047    Lab Status:  Final result Specimen:  Swab from Nasopharynx Updated:  06/10/20 0124     COVID19 Not Detected             Anti-Infectives (From admission, onward)      Ordered     Dose/Rate Route Frequency Start Stop    06/10/20 0430  vancomycin (VANCOCIN) 1,000 mg in sodium chloride 0.9 % 250 mL IVPB     Ordering " Provider:  Salome Short APRN    15 mg/kg × 72.4 kg (Adjusted)  over 60 Minutes Intravenous Every 24 Hours 06/11/20 0300 06/14/20 0259    06/10/20 0411  ampicillin-sulbactam (UNASYN) 1.5 g in sodium chloride 0.9 % 100 mL IVPB-MBP     Note to Pharmacy:  Had dose in ED time accordingly   Ordering Provider:  Salome Short APRN    1.5 g Intravenous Every 6 Hours 06/10/20 0700 06/14/20 0659    06/10/20 0411  Pharmacy to dose vancomycin     Ordering Provider:  Salome Short APRN     Does not apply Continuous PRN 06/10/20 0409 06/14/20 0408    06/10/20 0306  vancomycin IVPB 1500 mg in 0.9% NaCl (Premix) 250 mL     Ordering Provider:  James Kapoor MD    20 mg/kg × 72.4 kg (Adjusted) Intravenous Once 06/10/20 0308 06/10/20 0317            Ofelia Soto PharmD  06/10/20 04:31

## 2020-06-10 NOTE — ED PROVIDER NOTES
Subjective   69-year-old female complains of fever today, T-max 100.2, muscle aches, headache, symptoms are moderate, better with Tylenol.  No known sick contacts.  Patient has a resolving abscess in the left groin.  Patient was placed on Zyvox 4 days ago with significant improvement.  Pain resolved, abscesses decreased in size per patient.  patient denies any rectal pain or buttock pain or any deep extension of the abscess.          Review of Systems   Constitutional: Positive for chills and fever.   Skin: Positive for wound.   Neurological: Positive for headaches.   All other systems reviewed and are negative.      Past Medical History:   Diagnosis Date   • Gout    • Hypertension        No Known Allergies    Past Surgical History:   Procedure Laterality Date   • CHOLECYSTECTOMY     • HYSTERECTOMY     • TONSILLECTOMY         No family history on file.    Social History     Socioeconomic History   • Marital status:      Spouse name: Not on file   • Number of children: Not on file   • Years of education: Not on file   • Highest education level: Not on file   Tobacco Use   • Smoking status: Never Smoker   Substance and Sexual Activity   • Alcohol use: Never     Frequency: Never   • Drug use: Never           Objective   Physical Exam   Constitutional: She is oriented to person, place, and time. She appears well-developed and well-nourished.   HENT:   Head: Normocephalic and atraumatic.   Mouth/Throat: Oropharynx is clear and moist.   Eyes: Pupils are equal, round, and reactive to light. Conjunctivae are normal.   Neck: Normal range of motion. Neck supple.   Cardiovascular: Normal rate, regular rhythm, normal heart sounds and intact distal pulses.   Pulmonary/Chest: Effort normal and breath sounds normal.   Abdominal: Soft. Bowel sounds are normal. She exhibits no distension. There is no tenderness.   Genitourinary:   Genitourinary Comments: Left groin, lateral to the labia, no anal extension of healing quarter  size abscess, mild induration, no current drainage, minimal associated erythema, nontender   Musculoskeletal: Normal range of motion. She exhibits no edema or tenderness.   Neurological: She is alert and oriented to person, place, and time. No cranial nerve deficit.   Motor and sensation intact   Skin: Skin is warm and dry. Capillary refill takes less than 2 seconds.   Psychiatric: She has a normal mood and affect. Her behavior is normal.       Procedures           ED Course                                           MDM  Number of Diagnoses or Management Options  Abscess of groin, left:   Diagnosis management comments: Results for orders placed or performed during the hospital encounter of 06/10/20  -COVID-19, [facility] IN-HOUSE,NP Swab (NO TRANSPORT MEDIA) 2 HR TAT - Swab, Nasopharynx       Result                      Value             Ref Range           COVID19                     Not Detected      Not Detected*  -Comprehensive Metabolic Panel       Result                      Value             Ref Range           Glucose                     111 (H)           65 - 99 mg/dL       BUN                                                               Creatinine                  1.46 (H)          0.57 - 1.00 *       Sodium                      136               136 - 145 mm*       Potassium                   3.7               3.5 - 5.2 mm*       Chloride                    102               98 - 107 mmo*       CO2                         22.0              22.0 - 29.0 *       Calcium                     9.3               8.6 - 10.5 m*       Total Protein               7.3               6.0 - 8.5 g/*       Albumin                     4.00              3.50 - 5.20 *       ALT (SGPT)                  37 (H)            1 - 33 U/L          AST (SGOT)                  27                1 - 32 U/L          Alkaline Phosphatase        155 (H)           39 - 117 U/L        Total Bilirubin             0.4               0.2 - 1.2 mg*        eGFR Non  Amer       36 (L)            >60 mL/min/1*       Globulin                    3.3               gm/dL               A/G Ratio                   1.2               g/dL                BUN/Creatinine Ratio                                              Anion Gap                   12.0              5.0 - 15.0 m*  -CBC Auto Differential       Result                      Value             Ref Range           WBC                         13.00 (H)         3.40 - 10.80*       RBC                         4.28              3.77 - 5.28 *       Hemoglobin                  12.6              12.0 - 15.9 *       Hematocrit                  38.1              34.0 - 46.6 %       MCV                         88.9              79.0 - 97.0 *       MCH                         29.4              26.6 - 33.0 *       MCHC                        33.1              31.5 - 35.7 *       RDW                         15.1              12.3 - 15.4 %       RDW-SD                      46.8              37.0 - 54.0 *       MPV                         8.7               6.0 - 12.0 fL       Platelets                   228               140 - 450 10*       Neutrophil %                83.0 (H)          42.7 - 76.0 %       Lymphocyte %                8.2 (L)           19.6 - 45.3 %       Monocyte %                  5.2               5.0 - 12.0 %        Eosinophil %                2.5               0.3 - 6.2 %         Basophil %                  1.1               0.0 - 1.5 %         Neutrophils, Absolute       10.80 (H)         1.70 - 7.00 *       Lymphocytes, Absolute       1.10              0.70 - 3.10 *       Monocytes, Absolute         0.70              0.10 - 0.90 *       Eosinophils, Absolute       0.30              0.00 - 0.40 *       Basophils, Absolute         0.10              0.00 - 0.20 *       nRBC                        0.0               0.0 - 0.2 /1*  -Urinalysis With Culture If Indicated - Urine, Clean Catch       Result                       Value             Ref Range           Color, UA                                     Yellow, Straw   Dark Yellow (A)       Appearance, UA              Cloudy (A)        Clear               pH, UA                      5.5               5.0 - 8.0           Specific Gravity, UA        >=1.030           1.005 - 1.030       Glucose, UA                 Negative          Negative            Ketones, UA                 Trace (A)         Negative            Bilirubin, UA               Negative          Negative            Blood, UA                   Trace (A)         Negative            Protein, UA                 Trace (A)         Negative            Leuk Esterase, UA                             Negative        Small (1+) (A)       Nitrite, UA                 Negative          Negative            Urobilinogen, UA            0.2 E.U./dL       0.2 - 1.0 E.*  -Urinalysis, Microscopic Only - Urine, Clean Catch       Result                      Value             Ref Range           RBC, UA                     3-5 (A)           None Seen /H*       WBC, UA                     21-30 (A)         None Seen /H*       Bacteria, UA                Trace (A)         None Seen /H*       Squamous Epithelial Ce*     7-12 (A)          None Seen, 0*       Hyaline Casts, UA           0-2               None Seen /L*       Methodology                                                   Manual Light Microscopy  -BUN       Result                      Value             Ref Range           BUN                         25 (H)            8 - 23 mg/dL   -Urinalysis With Culture If Indicated - Urine, Catheter In/Out       Result                      Value             Ref Range           Color, UA                                     Yellow, Straw   Dark Yellow (A)       Appearance, UA              Clear             Clear               pH, UA                      5.5               5.0 - 8.0           Specific [place], UA        1.029              1.005 - 1.030       Glucose, UA                 Negative          Negative            Ketones, UA                 Negative          Negative            Bilirubin, UA               Negative          Negative            Blood, UA                   Negative          Negative            Protein, UA                 Trace (A)         Negative            Leuk Esterase, UA           Negative          Negative            Nitrite, UA                 Negative          Negative            Urobilinogen, UA            0.2 E.U./dL       0.2 - 1.0 E.*  -POC Lactate       Result                      Value             Ref Range           Lactate                     0.8               0.5 - 2.0 mm*  -Light Blue Top       Result                      Value             Ref Range           Extra Tube                                                    hold for add-on  -Green Top (Gel)       Result                      Value             Ref Range           Extra Tube                                                   -Lavender Top       Result                      Value             Ref Range           Extra Tube                                                   -Gold Top - SST       Result                      Value             Ref Range           Extra Tube                                                    Hold for add-ons.    Patient well, given abx for 4 days with groin infection in elderly diabetic without alternative source of fever, will admit for iv abx and observation.       Amount and/or Complexity of Data Reviewed  Clinical lab tests: reviewed  Decide to obtain previous medical records or to obtain history from someone other than the patient: yes        Final diagnoses:   Abscess of groin, left            Freeborn, James EDWARDS MD  06/10/20 0356

## 2020-06-11 LAB
ANION GAP SERPL CALCULATED.3IONS-SCNC: 10 MMOL/L (ref 5–15)
APTT PPP: 24.2 SECONDS (ref 24–31)
BACTERIA SPEC AEROBE CULT: NO GROWTH
BASOPHILS # BLD AUTO: 0.1 10*3/MM3 (ref 0–0.2)
BASOPHILS NFR BLD AUTO: 0.9 % (ref 0–1.5)
BUN BLD-MCNC: 16 MG/DL (ref 8–23)
BUN BLD-MCNC: ABNORMAL MG/DL
BUN/CREAT SERPL: ABNORMAL
CALCIUM SPEC-SCNC: 8.9 MG/DL (ref 8.6–10.5)
CHLORIDE SERPL-SCNC: 106 MMOL/L (ref 98–107)
CO2 SERPL-SCNC: 23 MMOL/L (ref 22–29)
CREAT BLD-MCNC: 1.07 MG/DL (ref 0.57–1)
DEPRECATED RDW RBC AUTO: 47.7 FL (ref 37–54)
EOSINOPHIL # BLD AUTO: 0.7 10*3/MM3 (ref 0–0.4)
EOSINOPHIL NFR BLD AUTO: 10.4 % (ref 0.3–6.2)
ERYTHROCYTE [DISTWIDTH] IN BLOOD BY AUTOMATED COUNT: 15.3 % (ref 12.3–15.4)
GFR SERPL CREATININE-BSD FRML MDRD: 51 ML/MIN/1.73
GLUCOSE BLD-MCNC: 100 MG/DL (ref 65–99)
GLUCOSE BLDC GLUCOMTR-MCNC: 171 MG/DL (ref 70–105)
GLUCOSE BLDC GLUCOMTR-MCNC: 87 MG/DL (ref 70–105)
GLUCOSE BLDC GLUCOMTR-MCNC: 90 MG/DL (ref 70–105)
GLUCOSE BLDC GLUCOMTR-MCNC: 93 MG/DL (ref 70–105)
HBA1C MFR BLD: 6.3 % (ref 3.5–5.6)
HCT VFR BLD AUTO: 33.9 % (ref 34–46.6)
HGB BLD-MCNC: 11.4 G/DL (ref 12–15.9)
INR PPP: 1.06 (ref 0.9–1.1)
LYMPHOCYTES # BLD AUTO: 1.9 10*3/MM3 (ref 0.7–3.1)
LYMPHOCYTES NFR BLD AUTO: 28.4 % (ref 19.6–45.3)
MCH RBC QN AUTO: 30.3 PG (ref 26.6–33)
MCHC RBC AUTO-ENTMCNC: 33.6 G/DL (ref 31.5–35.7)
MCV RBC AUTO: 90.1 FL (ref 79–97)
MONOCYTES # BLD AUTO: 0.5 10*3/MM3 (ref 0.1–0.9)
MONOCYTES NFR BLD AUTO: 6.9 % (ref 5–12)
NEUTROPHILS # BLD AUTO: 3.5 10*3/MM3 (ref 1.7–7)
NEUTROPHILS NFR BLD AUTO: 53.4 % (ref 42.7–76)
NRBC BLD AUTO-RTO: 0 /100 WBC (ref 0–0.2)
PLATELET # BLD AUTO: 189 10*3/MM3 (ref 140–450)
PMV BLD AUTO: 9.1 FL (ref 6–12)
POTASSIUM BLD-SCNC: 3.6 MMOL/L (ref 3.5–5.2)
PROTHROMBIN TIME: 11 SECONDS (ref 9.6–11.7)
RBC # BLD AUTO: 3.77 10*6/MM3 (ref 3.77–5.28)
SODIUM BLD-SCNC: 139 MMOL/L (ref 136–145)
WBC NRBC COR # BLD: 6.6 10*3/MM3 (ref 3.4–10.8)

## 2020-06-11 PROCEDURE — 25010000002 VANCOMYCIN 1 G RECONSTITUTED SOLUTION 1 EACH VIAL: Performed by: NURSE PRACTITIONER

## 2020-06-11 PROCEDURE — 99225 PR SBSQ OBSERVATION CARE/DAY 25 MINUTES: CPT | Performed by: HOSPITALIST

## 2020-06-11 PROCEDURE — 96361 HYDRATE IV INFUSION ADD-ON: CPT

## 2020-06-11 PROCEDURE — 85610 PROTHROMBIN TIME: CPT | Performed by: HOSPITALIST

## 2020-06-11 PROCEDURE — 82962 GLUCOSE BLOOD TEST: CPT

## 2020-06-11 PROCEDURE — 63710000001 INSULIN LISPRO (HUMAN) PER 5 UNITS: Performed by: NURSE PRACTITIONER

## 2020-06-11 PROCEDURE — 85730 THROMBOPLASTIN TIME PARTIAL: CPT | Performed by: HOSPITALIST

## 2020-06-11 PROCEDURE — G0378 HOSPITAL OBSERVATION PER HR: HCPCS

## 2020-06-11 PROCEDURE — 85025 COMPLETE CBC W/AUTO DIFF WBC: CPT | Performed by: HOSPITALIST

## 2020-06-11 PROCEDURE — 25010000003 AMPICILLIN-SULBACTAM PER 1.5 G: Performed by: NURSE PRACTITIONER

## 2020-06-11 PROCEDURE — 80048 BASIC METABOLIC PNL TOTAL CA: CPT | Performed by: HOSPITALIST

## 2020-06-11 RX ADMIN — AMPICILLIN SODIUM AND SULBACTAM SODIUM 1.5 G: 1; .5 INJECTION, POWDER, FOR SOLUTION INTRAMUSCULAR; INTRAVENOUS at 13:35

## 2020-06-11 RX ADMIN — Medication 5000 UNITS: at 08:25

## 2020-06-11 RX ADMIN — Medication 10 ML: at 08:34

## 2020-06-11 RX ADMIN — INSULIN LISPRO 2 UNITS: 100 INJECTION, SOLUTION INTRAVENOUS; SUBCUTANEOUS at 13:31

## 2020-06-11 RX ADMIN — PANTOPRAZOLE SODIUM 40 MG: 40 TABLET, DELAYED RELEASE ORAL at 21:19

## 2020-06-11 RX ADMIN — FOLIC ACID 1 MG: 1 TABLET ORAL at 08:26

## 2020-06-11 RX ADMIN — ROPINIROLE 2 MG: 1 TABLET, FILM COATED ORAL at 08:26

## 2020-06-11 RX ADMIN — METOPROLOL TARTRATE 50 MG: 50 TABLET, FILM COATED ORAL at 21:19

## 2020-06-11 RX ADMIN — FEBUXOSTAT 80 MG: 40 TABLET ORAL at 08:30

## 2020-06-11 RX ADMIN — METOPROLOL TARTRATE 50 MG: 50 TABLET, FILM COATED ORAL at 08:26

## 2020-06-11 RX ADMIN — GLIPIZIDE 20 MG: 5 TABLET ORAL at 08:26

## 2020-06-11 RX ADMIN — GLIPIZIDE 20 MG: 5 TABLET ORAL at 17:27

## 2020-06-11 RX ADMIN — ALLOPURINOL 300 MG: 300 TABLET ORAL at 08:25

## 2020-06-11 RX ADMIN — BUMETANIDE 2 MG: 1 TABLET ORAL at 08:25

## 2020-06-11 RX ADMIN — ROPINIROLE 2 MG: 1 TABLET, FILM COATED ORAL at 17:27

## 2020-06-11 RX ADMIN — Medication 5000 UNITS: at 21:19

## 2020-06-11 RX ADMIN — ROPINIROLE 2 MG: 1 TABLET, FILM COATED ORAL at 21:19

## 2020-06-11 RX ADMIN — AMPICILLIN SODIUM AND SULBACTAM SODIUM 1.5 G: 1; .5 INJECTION, POWDER, FOR SOLUTION INTRAMUSCULAR; INTRAVENOUS at 01:57

## 2020-06-11 RX ADMIN — Medication 10 ML: at 21:19

## 2020-06-11 RX ADMIN — SODIUM CHLORIDE 100 ML/HR: 900 INJECTION, SOLUTION INTRAVENOUS at 21:19

## 2020-06-11 RX ADMIN — SODIUM CHLORIDE 1000 MG: 900 INJECTION, SOLUTION INTRAVENOUS at 04:31

## 2020-06-11 RX ADMIN — AMPICILLIN SODIUM AND SULBACTAM SODIUM 1.5 G: 1; .5 INJECTION, POWDER, FOR SOLUTION INTRAMUSCULAR; INTRAVENOUS at 06:00

## 2020-06-11 NOTE — PLAN OF CARE
Problem: Patient Care Overview  Goal: Plan of Care Review  Outcome: Ongoing (interventions implemented as appropriate)  Flowsheets  Taken 6/11/2020 0532  Progress: no change  Taken 6/10/2020 1901  Plan of Care Reviewed With: patient  Note:   Patient rested comfortably in bed throughout the night.  Pain treated per MAR.  No new concerns at this time, will continue to monitor.

## 2020-06-11 NOTE — PROGRESS NOTES
"      [facility] Medicine Services Daily Progress Note      Hospitalist Team  LOS 0 days      Patient Care Team:  Ole Miranda MD as PCP - General (Internal Medicine)    Patient Location: [facility]/1      Subjective   Subjective     Chief Complaint / Subjective  Chief Complaint   Patient presents with   • Fever         Brief Synopsis of Hospital Course/HPI  Patient is a 69-year-old female who presented to the emergency room complaining of fever at home.  She reports that she has had a left labial wound for a couple of weeks that is painful and not improved with oral antibiotics.  She had leukocytosis with white count of 13,000 and normal lactic acid.  Patient reports that she has had a history of MRSA abscess on her face.  She was started on IV vancomycin and Unasyn and admitted.  The patient's culture grew MRSA.  White blood cell count normalized and she has been afebrile.    Date: 6/11/2020: Patient reports that her pain and swelling in her left labial area is 50% improved compared to when she came in.  She denies any associated complaints at this time.  She did ask about a nodule that she is noticed on her left posterior axillary area of her back for several months.  Gynecology saw the patient and recommended continuing IV antibiotics for 48 more hours and then consider general surgery consult if needed for debridement because of the extensiveness of the area involved.        ROS  12 point review of systems was reviewed and was negative except as above.    Objective   Objective      Vital Signs  Temp:  [97.7 °F (36.5 °C)-99.1 °F (37.3 °C)] 97.7 °F (36.5 °C)  Heart Rate:  [76-94] 82  Resp:  [16-19] 16  BP: (133-154)/(73-80) 154/75  Oxygen Therapy  SpO2: 96 %  Pulse Oximetry Type: Intermittent  Device (Oxygen Therapy): room air  Device (Oxygen Therapy): room air  Flowsheet Rows      First Filed Value   Admission Height  165.1 cm (65\") Documented at 06/10/2020 0004   Admission Weight  95.5 kg " (210 lb 8.6 oz) Documented at 06/10/2020 0004        Intake & Output (last 3 days)       06/08 0701 - 06/09 0700 06/09 0701 - 06/10 0700 06/10 0701 - 06/11 0700 06/11 0701 - 06/12 0700    P.O.   720 240    Total Intake(mL/kg)   720 (7.6) 240 (2.5)    Net   +720 +240                Lines, Drains & Airways    Active LDAs     Name:   Placement date:   Placement time:   Site:   Days:    Peripheral IV Distal;Left;Posterior Forearm   --    --    Forearm                     Physical Exam:    Physical Exam  Well-developed over-nourished female sitting up in the chair and then subsequently examined in the bed in no acute distress comfortable on room air, awake and alert; mucous membranes moist; sclerae anicteric; lungs clear to auscultation bilaterally; CV regular rate and rhythm; abdomen soft nontender nondistended with active bowel sounds; extremities with no edema, cyanosis or calf tenderness; palpable pedal pulses bilaterally; left posterior labia majora swelling and erythema have improved and there is an indurated central area that has an overlying purulent exudate; left posterior axillary area with a 2 x 2 centimeter nodule with overlying comedone with no tenderness or erythema; no Tilley catheter.     Wounds (last 24 hours)      LDA Wound     Row Name 06/11/20 0735 06/10/20 1901 06/10/20 1522       Wound 06/10/20 0435 labia    Wound - Properties Group Date first assessed: 06/10/20  -SC Time first assessed: 0435  -SC Present on Hospital Admission: Y  -SC Location: labia  -SC    Dressing Appearance  open to air;moist drainage  -HB  open to air;moist drainage  -SD  open to air;moist drainage  -HB    Closure  Open to air  -HB  Open to air  -SD  Open to air  -HB    Base  moist;red;yellow;white  -HB  moist;red;yellow;white  -SD  moist;red  -HB    Periwound  --  moist;redness;swelling;warm  -SD  --    Drainage Characteristics/Odor  --  serosanguineous  -SD  --    Drainage Amount  --  scant  -SD  scant  -HB    Row Name  06/10/20 1503             Wound 06/10/20 0435 labia    Wound - Properties Group Date first assessed: 06/10/20  -SC Time first assessed: 0435  -SC Present on Hospital Admission: Y  -SC Location: labia  -SC    Dressing Appearance  open to air;moist drainage pt states tender when walking  -TL      Closure  Open to air  -TL      Base  moist;red  -TL      Drainage Amount  scant  -TL        User Key  (r) = Recorded By, (t) = Taken By, (c) = Cosigned By    Initials Name Provider Type    Titi Sorenson, RN Registered Nurse    Emilio Phan RN Registered Nurse    Dafne Bullock RN Registered Nurse    Sue Leon RN Registered Nurse          Procedures:              Results Review:     I reviewed the patient's new clinical results.      Lab Results (last 24 hours)     Procedure Component Value Units Date/Time    POC Glucose Once [[id number]]  (Abnormal) Collected:  06/11/20 1128    Specimen:  Blood Updated:  06/11/20 1137     Glucose 171 mg/dL      Comment: Serial Number: [id number]:  [id number]       Urine Culture - Urine, Urine, Clean Catch [[id number]]  (Normal) Collected:  06/10/20 0103    Specimen:  Urine, Clean Catch Updated:  06/11/20 0859     Urine Culture No growth    Wound Culture - Wound, Groin [[id number]]  (Abnormal) Collected:  06/10/20 0035    Specimen:  Wound from Groin Updated:  06/11/20 0828     Wound Culture Scant growth (1+) Staphylococcus aureus, MRSA     Comment:   Methicillin resistant Staphylococcus aureus, Patient may be an isolation risk.        Gram Stain Few (2+) WBCs per low power field      Few (2+) Gram positive cocci    BUN [[id number]]  (Normal) Collected:  06/11/20 0206    Specimen:  Blood Updated:  06/11/20 0808     BUN 16 mg/dL     POC Glucose Once [[id number]]  (Normal) Collected:  06/11/20 0707    Specimen:  Blood Updated:  06/11/20 0709     Glucose 90 mg/dL      Comment: Serial Number: [id number]:  [id number]       Basic Metabolic Panel [[id number]]   (Abnormal) Collected:  06/11/20 0206    Specimen:  Blood Updated:  06/11/20 0430     Glucose 100 mg/dL      BUN --     Comment: Testing performed by alternate method        Creatinine 1.07 mg/dL      Sodium 139 mmol/L      Potassium 3.6 mmol/L      Chloride 106 mmol/L      CO2 23.0 mmol/L      Calcium 8.9 mg/dL      eGFR Non African Amer 51 mL/min/1.73      BUN/Creatinine Ratio --     Comment: Testing not performed.        Anion Gap 10.0 mmol/L     Narrative:       GFR Normal >60  Chronic Kidney Disease <60  Kidney Failure <15      aPTT [[id number]]  (Normal) Collected:  06/11/20 0206    Specimen:  Blood Updated:  06/11/20 0426     PTT 24.2 seconds     Protime-INR [[id number]]  (Normal) Collected:  06/11/20 0206    Specimen:  Blood Updated:  06/11/20 0426     Protime 11.0 Seconds      INR 1.06    CBC & Differential [[id number]] Collected:  06/11/20 0206    Specimen:  Blood Updated:  06/11/20 0408    Narrative:       The following orders were created for panel order CBC & Differential.  Procedure                               Abnormality         Status                     ---------                               -----------         ------                     CBC Auto Differential[[id number]]        Abnormal            Final result                 Please view results for these tests on the individual orders.    CBC Auto Differential [[id number]]  (Abnormal) Collected:  06/11/20 0206    Specimen:  Blood Updated:  06/11/20 0408     WBC 6.60 10*3/mm3      RBC 3.77 10*6/mm3      Hemoglobin 11.4 g/dL      Hematocrit 33.9 %      MCV 90.1 fL      MCH 30.3 pg      MCHC 33.6 g/dL      RDW 15.3 %      RDW-SD 47.7 fl      MPV 9.1 fL      Platelets 189 10*3/mm3      Neutrophil % 53.4 %      Lymphocyte % 28.4 %      Monocyte % 6.9 %      Eosinophil % 10.4 %      Basophil % 0.9 %      Neutrophils, Absolute 3.50 10*3/mm3      Lymphocytes, Absolute 1.90 10*3/mm3      Monocytes, Absolute 0.50 10*3/mm3      Eosinophils, Absolute 0.70  10*3/mm3      Basophils, Absolute 0.10 10*3/mm3      nRBC 0.0 /100 WBC     Blood Culture - Blood, Arm, Left [[id number]] Collected:  06/10/20 0035    Specimen:  Blood from Arm, Left Updated:  06/11/20 0045     Blood Culture No growth at 24 hours    Blood Culture - Blood, Arm, Right [[id number]] Collected:  06/10/20 0035    Specimen:  Blood from Arm, Right Updated:  06/11/20 0045     Blood Culture No growth at 24 hours    POC Glucose Once [[id number]]  (Normal) Collected:  06/10/20 2121    Specimen:  Blood Updated:  06/10/20 2123     Glucose 99 mg/dL      Comment: Serial Number: [id number]:  [id number]       POC Glucose Once [[id number]]  (Normal) Collected:  06/10/20 1651    Specimen:  Blood Updated:  06/10/20 1658     Glucose 88 mg/dL      Comment: Serial Number: [id number]:  [id number]           No results found for: HGBA1C  Results from last 7 days   Lab Units 06/11/20  0206   INR  1.06           No results found for: LIPASE  No results found for: CHOL, CHLPL, TRIG, HDL, LDL, LDLDIRECT    No results found for: INTRAOP, PREDX, FINALDX, COMDX    Microbiology Results (last 10 days)     Procedure Component Value - Date/Time    Urine Culture - Urine, Urine, Clean Catch [[id number]]  (Normal) Collected:  06/10/20 0103    Lab Status:  Final result Specimen:  Urine, Clean Catch Updated:  06/11/20 0859     Urine Culture No growth    COVID-19, [facility] IN-HOUSE,NP Swab (NO TRANSPORT MEDIA) 2 HR TAT - Swab, Nasopharynx [[id number]]  (Normal) Collected:  06/10/20 0047    Lab Status:  Final result Specimen:  Swab from Nasopharynx Updated:  06/10/20 0124     COVID19 Not Detected    Blood Culture - Blood, Arm, Left [[id number]] Collected:  06/10/20 0035    Lab Status:  Preliminary result Specimen:  Blood from Arm, Left Updated:  06/11/20 0045     Blood Culture No growth at 24 hours    Blood Culture - Blood, Arm, Right [[id number]] Collected:  06/10/20 0035    Lab Status:  Preliminary result Specimen:  Blood from Arm, Right  Updated:  06/11/20 0045     Blood Culture No growth at 24 hours    Wound Culture - Wound, Groin [[id number]]  (Abnormal) Collected:  06/10/20 0035    Lab Status:  Preliminary result Specimen:  Wound from Groin Updated:  06/11/20 0828     Wound Culture Scant growth (1+) Staphylococcus aureus, MRSA     Comment:   Methicillin resistant Staphylococcus aureus, Patient may be an isolation risk.        Gram Stain Few (2+) WBCs per low power field      Few (2+) Gram positive cocci          ECG/EMG Results (most recent)     None                    No radiology results for the last 7 days        Xrays, labs reviewed personally by physician.    Medication Review:   I have reviewed the patient's current medication list      Scheduled Meds    allopurinol 300 mg Oral Daily   ampicillin-sulbactam 1.5 g Intravenous Q6H   aspirin 81 mg Oral Once per day on Mon Wed Fri   bumetanide 2 mg Oral Daily   febuxostat 80 mg Oral Daily   folic acid 1 mg Oral Daily   glipizide 20 mg Oral BID AC   insulin lispro 0-7 Units Subcutaneous TID AC   metoprolol tartrate 50 mg Oral Q12H   pantoprazole 40 mg Oral QAM   rOPINIRole 2 mg Oral TID   sodium chloride 10 mL Intravenous Q12H   vancomycin 15 mg/kg (Adjusted) Intravenous Q24H   vitamin D3 5,000 Units Oral BID       Meds Infusions    Pharmacy to dose vancomycin     sodium chloride 100 mL/hr Last Rate: 100 mL/hr (06/10/20 2044)       Meds PRN  •  acetaminophen **OR** acetaminophen **OR** acetaminophen  •  dextrose  •  dextrose  •  glucagon (human recombinant)  •  insulin lispro **AND** insulin lispro  •  melatonin  •  nitroglycerin  •  ondansetron **OR** ondansetron  •  Pharmacy to dose vancomycin  •  sodium chloride  •  sodium chloride        Assessment/Plan   Assessment/Plan     Active Hospital Problems    Diagnosis  POA   • Abscess of groin, left [L02.214]  Yes      Resolved Hospital Problems   No resolved problems to display.       MEDICAL DECISION MAKING COMPLEXITY BY PROBLEM:     Left labial  abscess with cellulitis secondary to MRSA, spontaneously draining  -Continue IV antibiotics and monitor  -GYN consulting and recommending general surgery consult if surgical management is needed  Leukocytosis - no dyspnea or cough, UA shows trace bacteria without dysuria - on abx above      DM2 controlled with glucose 111, on Bydureon on Tuesday and glipizide , continue glipizide add ssi prn and accuchecks achs LCS diet      Essential HTN-chronic and controlled- on Bumex, metoprolol, monitor     Sebaceous cyst left posterior axillary area without signs of infection  -Patient counseled and reassured     Gout not in exacerbation - on febustat and allopurinol      HLD- on Ezetimibe - not on pharmacy     GERD- on Aciphex- protonix here      RLS- on Ropinirole     Diet supp on folic acid Vit D3      CAD prophylaxis - denies cad - on aspirin 81 mg daily      Obesity BMI 34.9- lifestyle management education     VTE Prophylaxis -   Mechanical Order History:      Ordered        06/10/20 2043  Place Sequential Compression Device  Once         06/10/20 2043  Maintain Sequential Compression Device  Continuous         06/10/20 0411  Place Sequential Compression Device  Once         06/10/20 0411  Maintain Sequential Compression Device  Continuous                 Pharmalogical Order History:     None            Code Status -   Code Status and Medical Interventions:   Ordered at: 06/10/20 0411     Code Status:    CPR     Medical Interventions (Level of Support Prior to Arrest):    Full       This patient has been examined wearing appropriate Personal Protective Equipment  06/11/20        Discharge Planning          Destination      Coordination has not been started for this encounter.      Durable Medical Equipment      Coordination has not been started for this encounter.      Dialysis/Infusion      Coordination has not been started for this encounter.      Home Medical Care      Coordination has not been started for this  encounter.      Therapy      Coordination has not been started for this encounter.      Community Resources      Coordination has not been started for this encounter.            Electronically signed by Victoria Ledbetter MD, 06/11/20, 14:38.  [facility]ist Team

## 2020-06-11 NOTE — PLAN OF CARE
Problem: Patient Care Overview  Goal: Plan of Care Review  Outcome: Ongoing (interventions implemented as appropriate)   No complaints. Will continue to monitor.

## 2020-06-11 NOTE — CONSULTS
69 y.o  Menopausal and diabetic female with left labia abscess.  Treated by pcp with po Bactrim and Im Rocephin.  No improvement prompting presentation to ER.  Now currently on IV Abx with normal white count.      Exam- external genital- left labia - inflamed, red and indurated. With yellow drainage.  No fluctulance appreciated. Remained of exam normal    A/p Vulvar abscess- Would continue IV Abx and re-evaluate after 48 hours.  Given her diabetic state and degree of involvement would ask general surgery to debride area if not improving. Will follow with you.

## 2020-06-12 LAB
BACTERIA SPEC AEROBE CULT: ABNORMAL
GLUCOSE BLDC GLUCOMTR-MCNC: 129 MG/DL (ref 70–105)
GLUCOSE BLDC GLUCOMTR-MCNC: 93 MG/DL (ref 70–105)
GLUCOSE BLDC GLUCOMTR-MCNC: 99 MG/DL (ref 70–105)
GRAM STN SPEC: ABNORMAL
GRAM STN SPEC: ABNORMAL

## 2020-06-12 PROCEDURE — 25010000003 AMPICILLIN-SULBACTAM PER 1.5 G: Performed by: NURSE PRACTITIONER

## 2020-06-12 PROCEDURE — 99225 PR SBSQ OBSERVATION CARE/DAY 25 MINUTES: CPT | Performed by: HOSPITALIST

## 2020-06-12 PROCEDURE — 96361 HYDRATE IV INFUSION ADD-ON: CPT

## 2020-06-12 PROCEDURE — 82962 GLUCOSE BLOOD TEST: CPT

## 2020-06-12 PROCEDURE — 25010000002 VANCOMYCIN 1 G RECONSTITUTED SOLUTION 1 EACH VIAL: Performed by: NURSE PRACTITIONER

## 2020-06-12 PROCEDURE — G0378 HOSPITAL OBSERVATION PER HR: HCPCS

## 2020-06-12 RX ADMIN — ALLOPURINOL 300 MG: 300 TABLET ORAL at 09:23

## 2020-06-12 RX ADMIN — Medication 5000 UNITS: at 09:23

## 2020-06-12 RX ADMIN — METOPROLOL TARTRATE 50 MG: 50 TABLET, FILM COATED ORAL at 06:30

## 2020-06-12 RX ADMIN — FOLIC ACID 1 MG: 1 TABLET ORAL at 09:23

## 2020-06-12 RX ADMIN — ROPINIROLE 2 MG: 1 TABLET, FILM COATED ORAL at 20:51

## 2020-06-12 RX ADMIN — ROPINIROLE 2 MG: 1 TABLET, FILM COATED ORAL at 09:23

## 2020-06-12 RX ADMIN — METOPROLOL TARTRATE 50 MG: 50 TABLET, FILM COATED ORAL at 20:51

## 2020-06-12 RX ADMIN — BUMETANIDE 2 MG: 1 TABLET ORAL at 09:23

## 2020-06-12 RX ADMIN — GLIPIZIDE 20 MG: 5 TABLET ORAL at 17:01

## 2020-06-12 RX ADMIN — PANTOPRAZOLE SODIUM 40 MG: 40 TABLET, DELAYED RELEASE ORAL at 20:52

## 2020-06-12 RX ADMIN — SODIUM CHLORIDE 1.5 G: 900 INJECTION INTRAVENOUS at 01:45

## 2020-06-12 RX ADMIN — SODIUM CHLORIDE 1.5 G: 900 INJECTION INTRAVENOUS at 12:16

## 2020-06-12 RX ADMIN — SODIUM CHLORIDE 100 ML/HR: 900 INJECTION, SOLUTION INTRAVENOUS at 09:31

## 2020-06-12 RX ADMIN — SODIUM CHLORIDE 100 ML/HR: 900 INJECTION, SOLUTION INTRAVENOUS at 20:51

## 2020-06-12 RX ADMIN — Medication 10 ML: at 21:01

## 2020-06-12 RX ADMIN — FEBUXOSTAT 80 MG: 40 TABLET ORAL at 09:23

## 2020-06-12 RX ADMIN — Medication 10 ML: at 09:23

## 2020-06-12 RX ADMIN — GLIPIZIDE 20 MG: 5 TABLET ORAL at 09:23

## 2020-06-12 RX ADMIN — Medication 5000 UNITS: at 20:51

## 2020-06-12 RX ADMIN — SODIUM CHLORIDE 1000 MG: 900 INJECTION, SOLUTION INTRAVENOUS at 04:20

## 2020-06-12 RX ADMIN — ROPINIROLE 2 MG: 1 TABLET, FILM COATED ORAL at 17:01

## 2020-06-12 RX ADMIN — SODIUM CHLORIDE 1.5 G: 900 INJECTION INTRAVENOUS at 06:03

## 2020-06-12 RX ADMIN — ASPIRIN 81 MG: 81 TABLET, COATED ORAL at 09:23

## 2020-06-12 NOTE — PROGRESS NOTES
[facility] ER Medicine Services Daily Progress Note      Hospitalist Team  LOS 0 days      Patient Care Team:  Ole Miranda MD as PCP - General (Internal Medicine)    Patient Location: [facility]/1      Subjective   Subjective     Chief Complaint / Subjective  Chief Complaint   Patient presents with   • Fever         Brief Synopsis of Hospital Course/HPI  Patient is a 69-year-old female who presented to the emergency room complaining of fever at home.  She reports that she has had a left labial wound for a couple of weeks that is painful and not improved with oral antibiotics.  She had leukocytosis with white count of 13,000 and normal lactic acid.  Patient reports that she has had a history of MRSA abscess on her face.  She was started on IV vancomycin and Unasyn and admitted.  The patient's culture grew MRSA.  White blood cell count normalized and she has been afebrile.    Date: 6/11/2020: Patient reports that her pain and swelling in her left labial area is 50% improved compared to when she came in.  She denies any associated complaints at this time.  She did ask about a nodule that she is noticed on her left posterior axillary area of her back for several months.  Gynecology saw the patient and recommended continuing IV antibiotics for 48 more hours and then consider general surgery consult if needed for debridement because of the extensiveness of the area involved.  6/12/2020: Patient reports decreased pain and swelling in her left labial area but she is noticed some increased nodularity to the central portion.      ROS  12 point review of systems was reviewed and was negative except as above.    Objective   Objective      Vital Signs  Temp:  [97.8 °F (36.6 °C)-98 °F (36.7 °C)] 98 °F (36.7 °C)  Heart Rate:  [72-82] 72  Resp:  [15-16] 16  BP: (153-178)/(79-84) 178/79  Oxygen Therapy  SpO2: 98 %  Pulse Oximetry Type: Intermittent  Device (Oxygen Therapy): room air  Device (Oxygen  "Therapy): room air  Flowsheet Rows      First Filed Value   Admission Height  165.1 cm (65\") Documented at 06/10/2020 0004   Admission Weight  95.5 kg (210 lb 8.6 oz) Documented at 06/10/2020 0004        Intake & Output (last 3 days)       06/09 0701 - 06/10 0700 06/10 0701 - 06/11 0700 06/11 0701 - 06/12 0700 06/12 0701 - 06/13 0700    P.O.  720 840 240    Total Intake(mL/kg)  720 (7.6) 840 (8.5) 240 (2.4)    Net  +720 +840 +240                Lines, Drains & Airways    Active LDAs     Name:   Placement date:   Placement time:   Site:   Days:    Peripheral IV Distal;Left;Posterior Forearm   --    --    Forearm                     Physical Exam:    Physical Exam  Well-developed over-nourished female lying in bed in no acute distress comfortable on room air, awake and alert; mucous membranes moist; sclerae anicteric; lungs clear to auscultation bilaterally; CV regular rate and rhythm; abdomen soft nontender nondistended with active bowel sounds; extremities with no edema, cyanosis or calf tenderness; left posterior labia majora swelling and erythema with central induration and open ulcerated area with scant purulent drainage; no Tilley catheter.     Wounds (last 24 hours)      LDA Wound     Row Name 06/12/20 0750 06/11/20 1901          Wound 06/10/20 0435 labia    Wound - Properties Group Date first assessed: 06/10/20  -SC Time first assessed: 0435  -SC Present on Hospital Admission: Y  -SC Location: labia  -SC    Dressing Appearance  open to air;moist drainage  -BB  open to air;moist drainage  -SD     Closure  Open to air  -BB  Open to air  -SD     Base  yellow;moist;white;red  -BB  moist;red;yellow;white  -SD     Periwound  --  moist;redness;swelling;warm  -SD     Drainage Characteristics/Odor  --  serosanguineous  -SD     Drainage Amount  --  scant  -SD       User Key  (r) = Recorded By, (t) = Taken By, (c) = Cosigned By    Initials Name Provider Type    Pauline Alanis, RN Registered Nurse    YELENA Paniagua, " Titi CHOU RN Registered Nurse    Dafne Bullock RN Registered Nurse          Procedures:              Results Review:     I reviewed the patient's new clinical results.      Lab Results (last 24 hours)     Procedure Component Value Units Date/Time    POC Glucose Once [[id number]]  (Normal) Collected:  06/12/20 1121    Specimen:  Blood Updated:  06/12/20 1130     Glucose 99 mg/dL      Comment: Serial Number: [id number]:  [id number]       POC Glucose Once [[id number]]  (Abnormal) Collected:  06/12/20 0724    Specimen:  Blood Updated:  06/12/20 0726     Glucose 129 mg/dL      Comment: Serial Number: [id number]:  [id number]       Wound Culture - Wound, Groin [[id number]]  (Abnormal)  (Susceptibility) Collected:  06/10/20 0035    Specimen:  Wound from Groin Updated:  06/12/20 0708     Wound Culture Scant growth (1+) Staphylococcus aureus, MRSA     Comment:   Methicillin resistant Staphylococcus aureus, Patient may be an isolation risk.        Gram Stain Few (2+) WBCs per low power field      Few (2+) Gram positive cocci    Susceptibility      Staphylococcus aureus, MRSA     RAMESH     Clindamycin Susceptible     Erythromycin Intermediate     Inducible Clindamycin Resistance Negative     Oxacillin Resistant     Penicillin G Resistant     Rifampin Susceptible     Tetracycline Susceptible     Trimethoprim + Sulfamethoxazole Resistant     Vancomycin Susceptible                Susceptibility Comments     Staphylococcus aureus, MRSA    This isolate does not demonstrate inducible clindamycin resistance in vitro.               Blood Culture - Blood, Arm, Left [[id number]] Collected:  06/10/20 0035    Specimen:  Blood from Arm, Left Updated:  06/12/20 0045     Blood Culture No growth at 2 days    Blood Culture - Blood, Arm, Right [[id number]] Collected:  06/10/20 0035    Specimen:  Blood from Arm, Right Updated:  06/12/20 0045     Blood Culture No growth at 2 days    POC Glucose Once [[id number]]  (Normal) Collected:   06/11/20 1925    Specimen:  Blood Updated:  06/11/20 1925     Glucose 93 mg/dL      Comment: Serial Number: [id number]:  [id number]       POC Glucose Once [[id number]]  (Normal) Collected:  06/11/20 1649    Specimen:  Blood Updated:  06/11/20 1650     Glucose 87 mg/dL      Comment: Serial Number: [id number]:  [id number]       Hemoglobin A1c [[id number]]  (Abnormal) Collected:  06/10/20 0047    Specimen:  Blood Updated:  06/11/20 1640     Hemoglobin A1C 6.3 %     Narrative:       Hemoglobin A1C Reference Range:    <5.7 %        Normal  5.7-6.4 %     Increased risk for diabetes  > 6.4 %        Diabetes       These guidelines have been recommended by the American Diabetic Association for Hgb A1c.      The following 2010 guidelines have been recommended by the American Diabetes Association for Hemoglobin A1c.    HBA1c 5.7-6.4% Increased risk for future diabetes (pre-diabetes)  HBA1c     >6.4% Diabetes          Hemoglobin A1C   Date Value Ref Range Status   06/10/2020 6.3 (H) 3.5 - 5.6 % Final     Results from last 7 days   Lab Units 06/11/20  0206   INR  1.06           No results found for: LIPASE  No results found for: CHOL, CHLPL, TRIG, HDL, LDL, LDLDIRECT    No results found for: INTRAOP, PREDX, FINALDX, COMDX    Microbiology Results (last 10 days)     Procedure Component Value - Date/Time    Urine Culture - Urine, Urine, Clean Catch [[id number]]  (Normal) Collected:  06/10/20 0103    Lab Status:  Final result Specimen:  Urine, Clean Catch Updated:  06/11/20 0859     Urine Culture No growth    COVID-19, ABBOTT IN-HOUSE,NP Swab (NO TRANSPORT MEDIA) 2 HR TAT - Swab, Nasopharynx [[id number]]  (Normal) Collected:  06/10/20 0047    Lab Status:  Final result Specimen:  Swab from Nasopharynx Updated:  06/10/20 0124     COVID19 Not Detected    Blood Culture - Blood, Arm, Left [[id number]] Collected:  06/10/20 0035    Lab Status:  Preliminary result Specimen:  Blood from Arm, Left Updated:  06/12/20 0045     Blood  Culture No growth at 2 days    Blood Culture - Blood, Arm, Right [[id number]] Collected:  06/10/20 0035    Lab Status:  Preliminary result Specimen:  Blood from Arm, Right Updated:  06/12/20 0045     Blood Culture No growth at 2 days    Wound Culture - Wound, Groin [[id number]]  (Abnormal)  (Susceptibility) Collected:  06/10/20 0035    Lab Status:  Final result Specimen:  Wound from Groin Updated:  06/12/20 0708     Wound Culture Scant growth (1+) Staphylococcus aureus, MRSA     Comment:   Methicillin resistant Staphylococcus aureus, Patient may be an isolation risk.        Gram Stain Few (2+) WBCs per low power field      Few (2+) Gram positive cocci    Susceptibility      Staphylococcus aureus, MRSA     RAMESH     Clindamycin Susceptible     Erythromycin Intermediate     Inducible Clindamycin Resistance Negative     Oxacillin Resistant     Penicillin G Resistant     Rifampin Susceptible     Tetracycline Susceptible     Trimethoprim + Sulfamethoxazole Resistant     Vancomycin Susceptible                Susceptibility Comments     Staphylococcus aureus, MRSA    This isolate does not demonstrate inducible clindamycin resistance in vitro.                     ECG/EMG Results (most recent)     None                    No radiology results for the last 7 days        Xrays, labs reviewed personally by physician.    Medication Review:   I have reviewed the patient's current medication list      Scheduled Meds    [START ON 6/13/2020] !Vancomycin Level Draw Needed  Does not apply Once   allopurinol 300 mg Oral Daily   ampicillin-sulbactam 1.5 g Intravenous Q6H   aspirin 81 mg Oral Once per day on Mon Wed Fri   bumetanide 2 mg Oral Daily   febuxostat 80 mg Oral Daily   folic acid 1 mg Oral Daily   glipizide 20 mg Oral BID AC   insulin lispro 0-7 Units Subcutaneous TID AC   metoprolol tartrate 50 mg Oral Q12H   pantoprazole 40 mg Oral QAM   rOPINIRole 2 mg Oral TID   sodium chloride 10 mL Intravenous Q12H   vancomycin 15 mg/kg  (Adjusted) Intravenous Q24H   vitamin D3 5,000 Units Oral BID       Meds Infusions    Pharmacy to dose vancomycin     sodium chloride 100 mL/hr Last Rate: 100 mL/hr (06/12/20 0931)       Meds PRN  acetaminophen **OR** acetaminophen **OR** acetaminophen  •  dextrose  •  dextrose  •  glucagon (human recombinant)  •  insulin lispro **AND** insulin lispro  •  melatonin  •  nitroglycerin  •  ondansetron **OR** ondansetron  •  Pharmacy to dose vancomycin  •  sodium chloride  •  sodium chloride        Assessment/Plan   Assessment/Plan     Active Hospital Problems    Diagnosis  POA   • Abscess of groin, left [L02.214]  Yes      Resolved Hospital Problems   No resolved problems to display.       MEDICAL DECISION MAKING COMPLEXITY BY PROBLEM:     Left labial abscess with cellulitis secondary to MRSA, spontaneously draining  -Continue IV vancomycin and monitor  -GYN consulting and recommending general surgery consult if surgical management is needed    Leukocytosis secondary to above, resolved     DM2 controlled with glucose 111, on Bydureon on Tuesday and glipizide , continue glipizide add ssi prn and accuchecks achs LCS diet      Essential HTN-chronic and controlled- on Bumex, metoprolol, monitor     Sebaceous cyst left posterior axillary area without signs of infection  -Patient counseled and reassured     Gout not in exacerbation - on febustat and allopurinol      HLD- on Ezetimibe - not on pharmacy     GERD- on Aciphex- protonix here      RLS- on Ropinirole     Diet supp on folic acid Vit D3      CAD prophylaxis - denies cad - on aspirin 81 mg daily      Obesity BMI 34.9- lifestyle management education     VTE Prophylaxis -   Mechanical Order History:      Ordered        06/10/20 2043  Place Sequential Compression Device  Once         06/10/20 2043  Maintain Sequential Compression Device  Continuous         06/10/20 0411  Place Sequential Compression Device  Once         06/10/20 0411  Maintain Sequential Compression  Device  Continuous                 Pharmalogical Order History:     None            Code Status -   Code Status and Medical Interventions:   Ordered at: 06/10/20 0411     Code Status:    CPR     Medical Interventions (Level of Support Prior to Arrest):    Full       This patient has been examined wearing appropriate Personal Protective Equipment  06/12/20        Discharge Planning          Destination      Coordination has not been started for this encounter.      Durable Medical Equipment      Coordination has not been started for this encounter.      Dialysis/Infusion      Coordination has not been started for this encounter.      Home Medical Care      Coordination has not been started for this encounter.      Therapy      Coordination has not been started for this encounter.      Community Resources      Coordination has not been started for this encounter.            Electronically signed by Victoria Ledbetter MD, 06/12/20, 15:41.  [facility] Hospitalist Team

## 2020-06-12 NOTE — PROGRESS NOTES
Referring Provider: Dr. Ledbetter  Reason for Consultation: Labial abscess    Patient Care Team:  Ole Miranda MD as PCP - General (Internal Medicine)    Chief complaint Labial abscess    Subjective .     History of present illness:  Feeling better today, states abscess is no longer painful, feels like it is smaller when she wipes.      Patient Active Problem List   Diagnosis   • Abscess of groin, left       Past Medical History:   Diagnosis Date   • Diabetes (CMS/HCC)    • Gout    • Hypertension        Past Surgical History:   Procedure Laterality Date   • CHOLECYSTECTOMY     • HYSTERECTOMY     • TONSILLECTOMY         No OB History data found    No Known Allergies      Objective     Vital Signs   Vitals:    20 1922 20 2300 20 0214 20 0630   BP: 168/84 153/81 177/81 174/83   BP Location: Right arm Right arm Right arm    Patient Position: Lying Lying Lying    Pulse: 80 79 82 81   Resp: 16  15    Temp: 97.8 °F (36.6 °C)  97.8 °F (36.6 °C)    TempSrc: Oral  Oral    SpO2: 94%  93%    Weight:   98.9 kg (218 lb)    Height:         Temp (24hrs), Av.8 °F (36.6 °C), Min:97.7 °F (36.5 °C), Max:97.8 °F (36.6 °C)      Physical Exam:     General Appearance:    Alert, cooperative, in no acute distress   Abdomen:     Normal bowel sounds, no masses, no organomegaly, soft        non-tender, non-distended, no guarding, no rebound                 tenderness   Genitalia:    Left labia with 5 cm area of induration / erythema that is mild.  No fluctuance, central 1-2 cm area of opening but no current drainage some yellow crusting surrounding opening, no purulence.  Remainder of vulva wnl.       Lab Results:  Lab Results (last 48 hours)     Procedure Component Value Units Date/Time    POC Glucose Once [[id number]]  (Abnormal) Collected:  20    Specimen:  Blood Updated:  20     Glucose 129 mg/dL      Comment: Serial Number: [id number]:  [id number]       Wound Culture - Wound,  Groin [[id number]]  (Abnormal)  (Susceptibility) Collected:  06/10/20 0035    Specimen:  Wound from Groin Updated:  06/12/20 0708     Wound Culture Scant growth (1+) Staphylococcus aureus, MRSA     Comment:   Methicillin resistant Staphylococcus aureus, Patient may be an isolation risk.        Gram Stain Few (2+) WBCs per low power field      Few (2+) Gram positive cocci    Susceptibility      Staphylococcus aureus, MRSA     RAMESH     Clindamycin Susceptible     Erythromycin Intermediate     Inducible Clindamycin Resistance Negative     Oxacillin Resistant     Penicillin G Resistant     Rifampin Susceptible     Tetracycline Susceptible     Trimethoprim + Sulfamethoxazole Resistant     Vancomycin Susceptible                Susceptibility Comments     Staphylococcus aureus, MRSA    This isolate does not demonstrate inducible clindamycin resistance in vitro.               Blood Culture - Blood, Arm, Left [[id number]] Collected:  06/10/20 0035    Specimen:  Blood from Arm, Left Updated:  06/12/20 0045     Blood Culture No growth at 2 days    Blood Culture - Blood, Arm, Right [[id number]] Collected:  06/10/20 0035    Specimen:  Blood from Arm, Right Updated:  06/12/20 0045     Blood Culture No growth at 2 days    POC Glucose Once [[id number]]  (Normal) Collected:  06/11/20 1925    Specimen:  Blood Updated:  06/11/20 1925     Glucose 93 mg/dL      Comment: Serial Number: [id number]:  [id number]       POC Glucose Once [[id number]]  (Normal) Collected:  06/11/20 1649    Specimen:  Blood Updated:  06/11/20 1650     Glucose 87 mg/dL      Comment: Serial Number: [id number]:  [id number]       Hemoglobin A1c [[id number]]  (Abnormal) Collected:  06/10/20 0047    Specimen:  Blood Updated:  06/11/20 1640     Hemoglobin A1C 6.3 %     Narrative:       Hemoglobin A1C Reference Range:    <5.7 %        Normal  5.7-6.4 %     Increased risk for diabetes  > 6.4 %        Diabetes       These guidelines have been recommended by the  American Diabetic Association for Hgb A1c.      The following 2010 guidelines have been recommended by the American Diabetes Association for Hemoglobin A1c.    HBA1c 5.7-6.4% Increased risk for future diabetes (pre-diabetes)  HBA1c     >6.4% Diabetes      POC Glucose Once [[id number]]  (Abnormal) Collected:  06/11/20 1128    Specimen:  Blood Updated:  06/11/20 1137     Glucose 171 mg/dL      Comment: Serial Number: [id number]:  [id number]       Urine Culture - Urine, Urine, Clean Catch [[id number]]  (Normal) Collected:  06/10/20 0103    Specimen:  Urine, Clean Catch Updated:  06/11/20 0859     Urine Culture No growth    BUN [[id number]]  (Normal) Collected:  06/11/20 0206    Specimen:  Blood Updated:  06/11/20 0808     BUN 16 mg/dL     POC Glucose Once [[id number]]  (Normal) Collected:  06/11/20 0707    Specimen:  Blood Updated:  06/11/20 0709     Glucose 90 mg/dL      Comment: Serial Number: [id number]:  [id number]       Basic Metabolic Panel [[id number]]  (Abnormal) Collected:  06/11/20 0206    Specimen:  Blood Updated:  06/11/20 0430     Glucose 100 mg/dL      BUN --     Comment: Testing performed by alternate method        Creatinine 1.07 mg/dL      Sodium 139 mmol/L      Potassium 3.6 mmol/L      Chloride 106 mmol/L      CO2 23.0 mmol/L      Calcium 8.9 mg/dL      eGFR Non African Amer 51 mL/min/1.73      BUN/Creatinine Ratio --     Comment: Testing not performed.        Anion Gap 10.0 mmol/L     Narrative:       GFR Normal >60  Chronic Kidney Disease <60  Kidney Failure <15      aPTT [[id number]]  (Normal) Collected:  06/11/20 0206    Specimen:  Blood Updated:  06/11/20 0426     PTT 24.2 seconds     Protime-INR [[id number]]  (Normal) Collected:  06/11/20 0206    Specimen:  Blood Updated:  06/11/20 0426     Protime 11.0 Seconds      INR 1.06    CBC & Differential [[id number]] Collected:  06/11/20 0206    Specimen:  Blood Updated:  06/11/20 0408    Narrative:       The following orders were created for panel  order CBC & Differential.  Procedure                               Abnormality         Status                     ---------                               -----------         ------                     CBC Auto Differential[[id number]]        Abnormal            Final result                 Please view results for these tests on the individual orders.    CBC Auto Differential [[id number]]  (Abnormal) Collected:  06/11/20 0206    Specimen:  Blood Updated:  06/11/20 0408     WBC 6.60 10*3/mm3      RBC 3.77 10*6/mm3      Hemoglobin 11.4 g/dL      Hematocrit 33.9 %      MCV 90.1 fL      MCH 30.3 pg      MCHC 33.6 g/dL      RDW 15.3 %      RDW-SD 47.7 fl      MPV 9.1 fL      Platelets 189 10*3/mm3      Neutrophil % 53.4 %      Lymphocyte % 28.4 %      Monocyte % 6.9 %      Eosinophil % 10.4 %      Basophil % 0.9 %      Neutrophils, Absolute 3.50 10*3/mm3      Lymphocytes, Absolute 1.90 10*3/mm3      Monocytes, Absolute 0.50 10*3/mm3      Eosinophils, Absolute 0.70 10*3/mm3      Basophils, Absolute 0.10 10*3/mm3      nRBC 0.0 /100 WBC     POC Glucose Once [[id number]]  (Normal) Collected:  06/10/20 2121    Specimen:  Blood Updated:  06/10/20 2123     Glucose 99 mg/dL      Comment: Serial Number: [id number]:  [id number]       POC Glucose Once [[id number]]  (Normal) Collected:  06/10/20 1651    Specimen:  Blood Updated:  06/10/20 1658     Glucose 88 mg/dL      Comment: Serial Number: [id number]:  [id number]       POC Glucose Once [[id number]]  (Abnormal) Collected:  06/10/20 1111    Specimen:  Blood Updated:  06/10/20 1136     Glucose 190 mg/dL      Comment: Serial Number: [id number]:  [id number]             Radiology Results:  Imaging Results (Last 72 Hours)     ** No results found for the last 72 hours. **          Assessment/Plan       Abscess of groin, left      Culture positive for MRSA, pt on Vancomycin/ Unasyn.  Clinically improving.  No signs of systemic infection (afebrile, WBC wnl, clinically well  appearing).  Recommend continue IV abx at this time.  Culture was sensitive to clindamycin which could be used for po therapy once discharged.  Due to diabetes and size/ location of abscess would recommend continuing IV abx inpatient at least one to two more days, re-evaluate tomorrow.     I discussed the patients findings and my recommendations with patient    Pilar Florence MD  06/12/20  09:04

## 2020-06-12 NOTE — PLAN OF CARE
Problem: Patient Care Overview  Goal: Plan of Care Review  Outcome: Ongoing (interventions implemented as appropriate)  Flowsheets  Taken 6/11/2020 0532 by Dafne Pressley, RN  Progress: no change  Taken 6/12/2020 0750 by Pauline Delgado, RN  Plan of Care Reviewed With: patient  Note:   Pt without any complaints of pain during shift. Swelling has gone down and pt feeling a lot better. 1 to 2 more days of IV antibiotics. Will continue to monitor

## 2020-06-12 NOTE — PLAN OF CARE
Problem: Patient Care Overview  Goal: Plan of Care Review  Outcome: Ongoing (interventions implemented as appropriate)  Note:   Patient rested comfortably in bed throughout the night without complaints of pain.  Patient stated she was feeling much better today.  No new concerns at this time, will continue to monitor.

## 2020-06-12 NOTE — PROGRESS NOTES
Discharge Planning Assessment   [place]     Patient Name: Leslee Casper  MRN: [id number]  Today's Date: 6/12/2020    Admit Date: 6/10/2020          Plan    Plan Comments  barriers to discharge is continuation of iv antibiotics at this time with hopefully switch to po antibiotics       Carol naegele rn  Case management  Office number [phone]  Cell phone [phone]

## 2020-06-13 LAB
ANION GAP SERPL CALCULATED.3IONS-SCNC: 10 MMOL/L (ref 5–15)
BASOPHILS # BLD AUTO: 0.1 10*3/MM3 (ref 0–0.2)
BASOPHILS NFR BLD AUTO: 1.3 % (ref 0–1.5)
BUN BLD-MCNC: 18 MG/DL (ref 8–23)
BUN BLD-MCNC: ABNORMAL MG/DL
BUN/CREAT SERPL: ABNORMAL
CALCIUM SPEC-SCNC: 9.3 MG/DL (ref 8.6–10.5)
CHLORIDE SERPL-SCNC: 105 MMOL/L (ref 98–107)
CO2 SERPL-SCNC: 25 MMOL/L (ref 22–29)
CREAT BLD-MCNC: 1.01 MG/DL (ref 0.57–1)
DEPRECATED RDW RBC AUTO: 47.7 FL (ref 37–54)
EOSINOPHIL # BLD AUTO: 0.4 10*3/MM3 (ref 0–0.4)
EOSINOPHIL NFR BLD AUTO: 5.2 % (ref 0.3–6.2)
ERYTHROCYTE [DISTWIDTH] IN BLOOD BY AUTOMATED COUNT: 15.3 % (ref 12.3–15.4)
GFR SERPL CREATININE-BSD FRML MDRD: 55 ML/MIN/1.73
GLUCOSE BLD-MCNC: 70 MG/DL (ref 65–99)
GLUCOSE BLDC GLUCOMTR-MCNC: 104 MG/DL (ref 70–105)
GLUCOSE BLDC GLUCOMTR-MCNC: 109 MG/DL (ref 70–105)
GLUCOSE BLDC GLUCOMTR-MCNC: 142 MG/DL (ref 70–105)
GLUCOSE BLDC GLUCOMTR-MCNC: 99 MG/DL (ref 70–105)
HCT VFR BLD AUTO: 34.4 % (ref 34–46.6)
HGB BLD-MCNC: 11.6 G/DL (ref 12–15.9)
LYMPHOCYTES # BLD AUTO: 2.6 10*3/MM3 (ref 0.7–3.1)
LYMPHOCYTES NFR BLD AUTO: 33 % (ref 19.6–45.3)
MCH RBC QN AUTO: 30.3 PG (ref 26.6–33)
MCHC RBC AUTO-ENTMCNC: 33.9 G/DL (ref 31.5–35.7)
MCV RBC AUTO: 89.4 FL (ref 79–97)
MONOCYTES # BLD AUTO: 0.5 10*3/MM3 (ref 0.1–0.9)
MONOCYTES NFR BLD AUTO: 6.5 % (ref 5–12)
NEUTROPHILS # BLD AUTO: 4.3 10*3/MM3 (ref 1.7–7)
NEUTROPHILS NFR BLD AUTO: 54 % (ref 42.7–76)
NRBC BLD AUTO-RTO: 0.1 /100 WBC (ref 0–0.2)
PLATELET # BLD AUTO: 234 10*3/MM3 (ref 140–450)
PMV BLD AUTO: 8.4 FL (ref 6–12)
POTASSIUM BLD-SCNC: 3.3 MMOL/L (ref 3.5–5.2)
RBC # BLD AUTO: 3.85 10*6/MM3 (ref 3.77–5.28)
SODIUM BLD-SCNC: 140 MMOL/L (ref 136–145)
VANCOMYCIN TROUGH SERPL-MCNC: 8 MCG/ML (ref 5–20)
WBC NRBC COR # BLD: 7.9 10*3/MM3 (ref 3.4–10.8)

## 2020-06-13 PROCEDURE — 25010000002 VANCOMYCIN 10 G RECONSTITUTED SOLUTION: Performed by: HOSPITALIST

## 2020-06-13 PROCEDURE — 85025 COMPLETE CBC W/AUTO DIFF WBC: CPT | Performed by: HOSPITALIST

## 2020-06-13 PROCEDURE — 96366 THER/PROPH/DIAG IV INF ADDON: CPT

## 2020-06-13 PROCEDURE — 80048 BASIC METABOLIC PNL TOTAL CA: CPT | Performed by: HOSPITALIST

## 2020-06-13 PROCEDURE — 99225 PR SBSQ OBSERVATION CARE/DAY 25 MINUTES: CPT | Performed by: HOSPITALIST

## 2020-06-13 PROCEDURE — 82962 GLUCOSE BLOOD TEST: CPT

## 2020-06-13 PROCEDURE — G0378 HOSPITAL OBSERVATION PER HR: HCPCS

## 2020-06-13 PROCEDURE — 80202 ASSAY OF VANCOMYCIN: CPT | Performed by: HOSPITALIST

## 2020-06-13 PROCEDURE — 25010000002 VANCOMYCIN 1 G RECONSTITUTED SOLUTION 1 EACH VIAL: Performed by: NURSE PRACTITIONER

## 2020-06-13 RX ORDER — POTASSIUM CHLORIDE 20 MEQ/1
40 TABLET, EXTENDED RELEASE ORAL AS NEEDED
Status: DISCONTINUED | OUTPATIENT
Start: 2020-06-13 | End: 2020-06-14 | Stop reason: HOSPADM

## 2020-06-13 RX ORDER — POTASSIUM CHLORIDE 1.5 G/1.77G
40 POWDER, FOR SOLUTION ORAL AS NEEDED
Status: DISCONTINUED | OUTPATIENT
Start: 2020-06-13 | End: 2020-06-14 | Stop reason: HOSPADM

## 2020-06-13 RX ORDER — MAGNESIUM SULFATE 1 G/100ML
1 INJECTION INTRAVENOUS AS NEEDED
Status: DISCONTINUED | OUTPATIENT
Start: 2020-06-13 | End: 2020-06-14 | Stop reason: HOSPADM

## 2020-06-13 RX ORDER — MAGNESIUM SULFATE HEPTAHYDRATE 40 MG/ML
2 INJECTION, SOLUTION INTRAVENOUS AS NEEDED
Status: DISCONTINUED | OUTPATIENT
Start: 2020-06-13 | End: 2020-06-14 | Stop reason: HOSPADM

## 2020-06-13 RX ADMIN — Medication 5000 UNITS: at 20:47

## 2020-06-13 RX ADMIN — ALLOPURINOL 300 MG: 300 TABLET ORAL at 09:26

## 2020-06-13 RX ADMIN — Medication 10 ML: at 09:25

## 2020-06-13 RX ADMIN — SODIUM CHLORIDE 1000 MG: 900 INJECTION, SOLUTION INTRAVENOUS at 03:21

## 2020-06-13 RX ADMIN — ROPINIROLE 2 MG: 1 TABLET, FILM COATED ORAL at 16:39

## 2020-06-13 RX ADMIN — METOPROLOL TARTRATE 50 MG: 50 TABLET, FILM COATED ORAL at 09:26

## 2020-06-13 RX ADMIN — FEBUXOSTAT 80 MG: 40 TABLET ORAL at 09:26

## 2020-06-13 RX ADMIN — Medication 5000 UNITS: at 09:26

## 2020-06-13 RX ADMIN — METOPROLOL TARTRATE 50 MG: 50 TABLET, FILM COATED ORAL at 20:47

## 2020-06-13 RX ADMIN — SODIUM CHLORIDE 1250 MG: 9 INJECTION, SOLUTION INTRAVENOUS at 21:44

## 2020-06-13 RX ADMIN — Medication 10 ML: at 21:45

## 2020-06-13 RX ADMIN — ROPINIROLE 2 MG: 1 TABLET, FILM COATED ORAL at 20:47

## 2020-06-13 RX ADMIN — BUMETANIDE 2 MG: 1 TABLET ORAL at 09:25

## 2020-06-13 RX ADMIN — GLIPIZIDE 20 MG: 5 TABLET ORAL at 16:39

## 2020-06-13 RX ADMIN — ROPINIROLE 2 MG: 1 TABLET, FILM COATED ORAL at 09:25

## 2020-06-13 RX ADMIN — PANTOPRAZOLE SODIUM 40 MG: 40 TABLET, DELAYED RELEASE ORAL at 20:47

## 2020-06-13 RX ADMIN — GLIPIZIDE 20 MG: 5 TABLET ORAL at 09:26

## 2020-06-13 RX ADMIN — Medication: at 03:22

## 2020-06-13 RX ADMIN — FOLIC ACID 1 MG: 1 TABLET ORAL at 09:26

## 2020-06-13 NOTE — PROGRESS NOTES
[facility] Medicine Services Daily Progress Note      Hospitalist Team  LOS 0 days      Patient Care Team:  Ole Miranda MD as PCP - General (Internal Medicine)    Patient Location: [facility]/1      Subjective   Subjective     Chief Complaint / Subjective  Chief Complaint   Patient presents with   • Fever         Brief Synopsis of Hospital Course/HPI  Patient is a 69-year-old female who presented to the emergency room complaining of fever at home.  She reports that she has had a left labial wound for a couple of weeks that is painful and not improved with oral antibiotics.  She had leukocytosis with white count of 13,000 and normal lactic acid.  Patient reports that she has had a history of MRSA abscess on her face.  She was started on IV vancomycin and Unasyn and admitted.  The patient's culture grew MRSA.  White blood cell count normalized and she has been afebrile.    Date: 6/11/2020: Patient reports that her pain and swelling in her left labial area is 50% improved compared to when she came in.  She denies any associated complaints at this time.  She did ask about a nodule that she is noticed on her left posterior axillary area of her back for several months.  Gynecology saw the patient and recommended continuing IV antibiotics for 48 more hours and then consider general surgery consult if needed for debridement because of the extensiveness of the area involved.  6/12/2020: Patient reports decreased pain and swelling in her left labial area but she is noticed some increased nodularity to the central portion.  6/13/2020: The patient has continued decreased pain in the area of the abscess in the left labia.    ROS  12 point review of systems was reviewed and was negative except as above.    Objective   Objective      Vital Signs  Temp:  [97.5 °F (36.4 °C)-97.8 °F (36.6 °C)] 97.6 °F (36.4 °C)  Heart Rate:  [74-82] 78  Resp:  [16-18] 16  BP: (133-173)/(71-83) 133/71  Oxygen  "Therapy  SpO2: 95 %  Pulse Oximetry Type: Intermittent  Device (Oxygen Therapy): room air  Device (Oxygen Therapy): room air  Flowsheet Rows      First Filed Value   Admission Height  165.1 cm (65\") Documented at 06/10/2020 0004   Admission Weight  95.5 kg (210 lb 8.6 oz) Documented at 06/10/2020 0004        Intake & Output (last 3 days)       06/11 0701 - 06/12 0700 06/12 0701 - 06/13 0700 06/13 0701 - 06/14 0700    P.O. 840 240 360    I.V. (mL/kg)  1000 (10.1)     IV Piggyback  250     Total Intake(mL/kg) 840 (8.5) 1490 (15.1) 360 (3.6)    Net +840 +1490 +360               Lines, Drains & Airways    Active LDAs     Name:   Placement date:   Placement time:   Site:   Days:    Peripheral IV Distal;Left;Posterior Forearm   --    --    Forearm                     Physical Exam:    Physical Exam  Well-developed over-nourished female lying in bed in no acute distress comfortable on room air, awake and alert; mucous membranes moist; sclerae anicteric; lungs clear to auscultation bilaterally; CV regular rate and rhythm; abdomen soft nontender nondistended with active bowel sounds; extremities with no edema, cyanosis or calf tenderness; left posterior labia majora with improvement in the swelling and erythema and decreased induration; persistent open ulcerated area with scant purulent drainage; no Tilley catheter.     Wounds (last 24 hours)      LDA Wound     Row Name 06/13/20 0701 06/12/20 2053          Wound 06/10/20 0435 labia    Wound - Properties Group Date first assessed: 06/10/20  -SC Time first assessed: 0435  -SC Present on Hospital Admission: Y  -SC Location: labia  -SC    Dressing Appearance  open to air;moist drainage  -JW  open to air;moist drainage  -MF     Drainage Amount  scant  -JW  scant  -MF       User Key  (r) = Recorded By, (t) = Taken By, (c) = Cosigned By    Initials Name Provider Type     Frida Nath LPN Licensed Nurse    Tamiko Pool LPN Licensed Nurse    Titi Sorenson, SHIRA " Registered Nurse          Procedures:              Results Review:     I reviewed the patient's new clinical results.      Lab Results (last 24 hours)     Procedure Component Value Units Date/Time    POC Glucose Once [[id number]]  (Normal) Collected:  06/13/20 1635    Specimen:  Blood Updated:  06/13/20 1637     Glucose 99 mg/dL      Comment: Serial Number: [id number]:  [id number]       POC Glucose Once [[id number]]  (Abnormal) Collected:  06/12/20 2028    Specimen:  Blood Updated:  06/13/20 1622     Glucose 109 mg/dL      Comment: Serial Number: [id number]:  [id number]       POC Glucose Once [[id number]]  (Abnormal) Collected:  06/13/20 1151    Specimen:  Blood Updated:  06/13/20 1157     Glucose 142 mg/dL      Comment: Serial Number: [id number]:  [id number]       BUN [[id number]]  (Normal) Collected:  06/13/20 0216    Specimen:  Blood Updated:  06/13/20 0822     BUN 18 mg/dL     POC Glucose Once [[id number]]  (Normal) Collected:  06/13/20 0719    Specimen:  Blood Updated:  06/13/20 0720     Glucose 104 mg/dL      Comment: Serial Number: [id number]:  [id number]       Vancomycin, Trough [[id number]]  (Normal) Collected:  06/13/20 0216    Specimen:  Blood Updated:  06/13/20 0247     Vancomycin Trough 8.00 mcg/mL     Basic Metabolic Panel [[id number]]  (Abnormal) Collected:  06/13/20 0216    Specimen:  Blood Updated:  06/13/20 0247     Glucose 70 mg/dL      BUN --     Comment: Testing performed by alternate method        Creatinine 1.01 mg/dL      Sodium 140 mmol/L      Potassium 3.3 mmol/L      Chloride 105 mmol/L      CO2 25.0 mmol/L      Calcium 9.3 mg/dL      eGFR Non African Amer 55 mL/min/1.73      BUN/Creatinine Ratio --     Comment: Testing not performed.        Anion Gap 10.0 mmol/L     Narrative:       GFR Normal >60  Chronic Kidney Disease <60  Kidney Failure <15      CBC & Differential [[id number]] Collected:  06/13/20 0215    Specimen:  Blood Updated:  06/13/20 0233    Narrative:       The  following orders were created for panel order CBC & Differential.  Procedure                               Abnormality         Status                     ---------                               -----------         ------                     CBC Auto Differential[[id number]]        Abnormal            Final result                 Please view results for these tests on the individual orders.    CBC Auto Differential [[id number]]  (Abnormal) Collected:  06/13/20 0215    Specimen:  Blood Updated:  06/13/20 0233     WBC 7.90 10*3/mm3      RBC 3.85 10*6/mm3      Hemoglobin 11.6 g/dL      Hematocrit 34.4 %      MCV 89.4 fL      MCH 30.3 pg      MCHC 33.9 g/dL      RDW 15.3 %      RDW-SD 47.7 fl      MPV 8.4 fL      Platelets 234 10*3/mm3      Neutrophil % 54.0 %      Lymphocyte % 33.0 %      Monocyte % 6.5 %      Eosinophil % 5.2 %      Basophil % 1.3 %      Neutrophils, Absolute 4.30 10*3/mm3      Lymphocytes, Absolute 2.60 10*3/mm3      Monocytes, Absolute 0.50 10*3/mm3      Eosinophils, Absolute 0.40 10*3/mm3      Basophils, Absolute 0.10 10*3/mm3      nRBC 0.1 /100 WBC     Blood Culture - Blood, Arm, Right [[id number]] Collected:  06/10/20 0035    Specimen:  Blood from Arm, Right Updated:  06/13/20 0046     Blood Culture No growth at 3 days    Blood Culture - Blood, Arm, Left [[id number]] Collected:  06/10/20 0035    Specimen:  Blood from Arm, Left Updated:  06/13/20 0046     Blood Culture No growth at 3 days        No results found for: HGBA1C  Results from last 7 days   Lab Units 06/11/20  0206   INR  1.06           No results found for: LIPASE  No results found for: CHOL, CHLPL, TRIG, HDL, LDL, LDLDIRECT    No results found for: INTRAOP, PREDX, FINALDX, COMDX    Microbiology Results (last 10 days)     Procedure Component Value - Date/Time    Urine Culture - Urine, Urine, Clean Catch [[id number]]  (Normal) Collected:  06/10/20 0103    Lab Status:  Final result Specimen:  Urine, Clean Catch Updated:  06/11/20 0867      Urine Culture No growth    COVID-19, ABBOTT IN-HOUSE,NP Swab (NO TRANSPORT MEDIA) 2 HR TAT - Swab, Nasopharynx [[id number]]  (Normal) Collected:  06/10/20 0047    Lab Status:  Final result Specimen:  Swab from Nasopharynx Updated:  06/10/20 0124     COVID19 Not Detected    Blood Culture - Blood, Arm, Left [[id number]] Collected:  06/10/20 0035    Lab Status:  Preliminary result Specimen:  Blood from Arm, Left Updated:  06/13/20 0046     Blood Culture No growth at 3 days    Blood Culture - Blood, Arm, Right [[id number]] Collected:  06/10/20 0035    Lab Status:  Preliminary result Specimen:  Blood from Arm, Right Updated:  06/13/20 0046     Blood Culture No growth at 3 days    Wound Culture - Wound, Groin [[id number]]  (Abnormal)  (Susceptibility) Collected:  06/10/20 0035    Lab Status:  Final result Specimen:  Wound from Groin Updated:  06/12/20 0708     Wound Culture Scant growth (1+) Staphylococcus aureus, MRSA     Comment:   Methicillin resistant Staphylococcus aureus, Patient may be an isolation risk.        Gram Stain Few (2+) WBCs per low power field      Few (2+) Gram positive cocci    Susceptibility      Staphylococcus aureus, MRSA     RAMESH     Clindamycin Susceptible     Erythromycin Intermediate     Inducible Clindamycin Resistance Negative     Oxacillin Resistant     Penicillin G Resistant     Rifampin Susceptible     Tetracycline Susceptible     Trimethoprim + Sulfamethoxazole Resistant     Vancomycin Susceptible                Susceptibility Comments     Staphylococcus aureus, MRSA    This isolate does not demonstrate inducible clindamycin resistance in vitro.                     ECG/EMG Results (most recent)     None                    No radiology results for the last 7 days        Xrays, labs reviewed personally by physician.    Medication Review:   I have reviewed the patient's current medication list      Scheduled Meds    [START ON 6/15/2020] !Vancomycin Level Draw Needed  Does not apply Once    allopurinol 300 mg Oral Daily   aspirin 81 mg Oral Once per day on Mon Wed Fri   bumetanide 2 mg Oral Daily   febuxostat 80 mg Oral Daily   folic acid 1 mg Oral Daily   glipizide 20 mg Oral BID AC   insulin lispro 0-7 Units Subcutaneous TID AC   metoprolol tartrate 50 mg Oral Q12H   pantoprazole 40 mg Oral QAM   rOPINIRole 2 mg Oral TID   sodium chloride 10 mL Intravenous Q12H   vancomycin 1,250 mg Intravenous Q18H   vitamin D3 5,000 Units Oral BID       Meds Infusions    Pharmacy to dose vancomycin     sodium chloride 100 mL/hr Last Rate: 100 mL/hr (06/12/20 2051)       Meds PRN  acetaminophen **OR** acetaminophen **OR** acetaminophen  •  dextrose  •  dextrose  •  glucagon (human recombinant)  •  insulin lispro **AND** insulin lispro  •  magnesium sulfate **OR** magnesium sulfate in D5W 1g/100mL (PREMIX)  •  melatonin  •  nitroglycerin  •  ondansetron **OR** ondansetron  •  Pharmacy to dose vancomycin  •  potassium chloride  •  potassium chloride  •  sodium chloride  •  sodium chloride        Assessment/Plan   Assessment/Plan     Active Hospital Problems    Diagnosis  POA   • Abscess of groin, left [L02.214]  Yes      Resolved Hospital Problems   No resolved problems to display.       MEDICAL DECISION MAKING COMPLEXITY BY PROBLEM:     Left labial abscess with cellulitis secondary to MRSA, spontaneously draining  -Continue IV vancomycin and monitor  -GYN consulting and recommending 1-2 more days of IV antibiotics and then switch to oral clindamycin at discharge    Leukocytosis secondary to above, resolved     DM2 controlled with glucose 111, on Bydureon on Tuesday and glipizide preadmission -continue glipizide   -Continue Ssi prn and accuchecks achs   -LCS diet      Essential HTN-chronic and controlled- on Bumex, metoprolol, monitor     Sebaceous cyst left posterior axillary area without signs of infection  -Patient counseled and reassured     Gout not in exacerbation - on febustat and allopurinol      HLD- on  Ezetimibe - not on formulary     GERD- on Aciphex-continue Protonix formulary substitution     RLS- on Ropinirole     Diet supp on folic acid Vit D3      CAD prophylaxis - on aspirin 81 mg daily      Obesity BMI 34.9- lifestyle management education     VTE Prophylaxis -   Mechanical Order History:      Ordered        06/10/20 2043  Place Sequential Compression Device  Once         06/10/20 2043  Maintain Sequential Compression Device  Continuous         06/10/20 0411  Place Sequential Compression Device  Once         06/10/20 0411  Maintain Sequential Compression Device  Continuous                 Pharmalogical Order History:     None            Code Status -   Code Status and Medical Interventions:   Ordered at: 06/10/20 0411     Code Status:    CPR     Medical Interventions (Level of Support Prior to Arrest):    Full       This patient has been examined wearing appropriate Personal Protective Equipment  06/13/20        Discharge Planning          Destination      Coordination has not been started for this encounter.      Durable Medical Equipment      Coordination has not been started for this encounter.      Dialysis/Infusion      Coordination has not been started for this encounter.      Home Medical Care      Coordination has not been started for this encounter.      Therapy      Coordination has not been started for this encounter.      Community Resources      Coordination has not been started for this encounter.            Electronically signed by Victoria Ledbetter MD, 06/13/20, 19:38.  [facility] Hospitalist Team

## 2020-06-13 NOTE — PROGRESS NOTES
"Pharmacy Antimicrobial Dosing Service    Subjective:  Leslee Casper is a 69 y.o.female admitted with abscess of groin. Pharmacy has been consulted to dose Vancomycin for possible skin & soft tissue infection. Patient had fever at home and has had left labial wound for a couple of weeks that has been painful and not improved with oral antibiotics.     PMH: Previously on Bactrim and Keflex for left groin wound and then Zyvox for facial cellulitis which has cleared, T2DM     6/10 Wound Groin: MRSA (R to Bactrim)       Assessment/Plan    1. Day #4 Vancomycin: Goal trough 10-20 mcg/mL. Patient receiving 1000 mg (~14 mg/kg DBW) IV q24h. Trough today = 8 mcg/mL (~22 hrs post dose), true trough lower. Per MD notes, patient to continue on IV abx at this time and may be able to transition to PO abx at discharge. Therefore, based on subtherapeutic trough today, will increase dose to 1250 mg (~17 mg/kg DBW) IV q18h. Repeat level ordered for 6/15 at 0700 if therapy continued.     2. Unasyn: Patient received 3 days - last dose on 6/12     Will continue to monitor drug levels, renal function, culture and sensitivities, and patient clinical status.       Objective:  Relevant clinical data and objective history reviewed:  165.1 cm (65\")   98.9 kg (218 lb)   Ideal body weight: 57 kg (125 lb 10.6 oz)  Adjusted ideal body weight: 73.8 kg (162 lb 9.6 oz)  Body mass index is 36.28 kg/m².    Results from last 7 days   Lab Units 06/13/20  0216   VANCOMYCIN TR mcg/mL 8.00     Results from last 7 days   Lab Units 06/13/20  0216 06/11/20  0206 06/10/20  0047   CREATININE mg/dL 1.01* 1.07* 1.46*     Estimated Creatinine Clearance: 62.1 mL/min (A) (by C-G formula based on SCr of 1.01 mg/dL (H)).  I/O last 3 completed shifts:  In: 1850 [P.O.:600; I.V.:1000; IV Piggyback:250]  Out: -     Results from last 7 days   Lab Units 06/13/20 0215 06/11/20  0206 06/10/20  0047   WBC 10*3/mm3 7.90 6.60 13.00*     Temperature    06/12/20 1123 06/12/20 " 2024 06/13/20 0357   Temp: 98 °F (36.7 °C) 97.8 °F (36.6 °C) 97.5 °F (36.4 °C)     Baseline culture/source/susceptibility:  Microbiology Results (last 10 days)       Procedure Component Value - Date/Time    Urine Culture - Urine, Urine, Clean Catch [[id number]]  (Normal) Collected:  06/10/20 0103    Lab Status:  Final result Specimen:  Urine, Clean Catch Updated:  06/11/20 0859     Urine Culture No growth    COVID-19, ABBOTT IN-HOUSE,NP Swab (NO TRANSPORT MEDIA) 2 HR TAT - Swab, Nasopharynx [[id number]]  (Normal) Collected:  06/10/20 0047    Lab Status:  Final result Specimen:  Swab from Nasopharynx Updated:  06/10/20 0124     COVID19 Not Detected    Blood Culture - Blood, Arm, Left [[id number]] Collected:  06/10/20 0035    Lab Status:  Preliminary result Specimen:  Blood from Arm, Left Updated:  06/13/20 0046     Blood Culture No growth at 3 days    Blood Culture - Blood, Arm, Right [[id number]] Collected:  06/10/20 0035    Lab Status:  Preliminary result Specimen:  Blood from Arm, Right Updated:  06/13/20 0046     Blood Culture No growth at 3 days    Wound Culture - Wound, Groin [[id number]]  (Abnormal)  (Susceptibility) Collected:  06/10/20 0035    Lab Status:  Final result Specimen:  Wound from Groin Updated:  06/12/20 0708     Wound Culture Scant growth (1+) Staphylococcus aureus, MRSA     Comment:   Methicillin resistant Staphylococcus aureus, Patient may be an isolation risk.        Gram Stain Few (2+) WBCs per low power field      Few (2+) Gram positive cocci    Susceptibility        Staphylococcus aureus, MRSA     RAMESH     Clindamycin Susceptible     Erythromycin Intermediate     Inducible Clindamycin Resistance Negative     Oxacillin Resistant     Penicillin G Resistant     Rifampin Susceptible     Tetracycline Susceptible     Trimethoprim + Sulfamethoxazole Resistant     Vancomycin Susceptible                  Susceptibility Comments       Staphylococcus aureus, MRSA    This isolate does not demonstrate  inducible clindamycin resistance in vitro.                          Anti-Infectives (From admission, onward)      Ordered     Dose/Rate Route Frequency Start Stop    20 0842  !Vancomycin Level Draw Needed     Ordering Provider:  Victoria Ledbetter MD     Does not apply Once 06/15/20 0700      20 0842  vancomycin 1250 mg/250 mL 0.9% NS IVPB ([facility])     Ordering Provider:  Victoria Ledbetter MD    1,250 mg Intravenous Every 18 Hours 20 2000 20 0159    20 1045  !Vancomycin Level Draw Needed     Ordering Provider:  Victoria Ledbetter MD     Does not apply Once 20 0200 20 0322    06/10/20 0430  vancomycin (VANCOCIN) 1,000 mg in sodium chloride 0.9 % 250 mL IVPB     Ordering Provider:  Salome Short APRN    15 mg/kg × 72.4 kg (Adjusted)  over 60 Minutes Intravenous Every 24 Hours 20 0300 20 0730    06/10/20 0411  Pharmacy to dose vancomycin  Let    Ordering Provider:  Salome Short APRN     Does not apply Continuous PRN 06/10/20 0409 20 0408    06/10/20 0306  vancomycin IVPB 1500 mg in 0.9% NaCl (Premix) 250 mL     Ordering Provider:  James Kapoor MD    20 mg/kg × 72.4 kg (Adjusted) Intravenous Once 06/10/20 0308 06/10/20 0729            Lelia Casper, CarolaD  20 08:43

## 2020-06-13 NOTE — PROGRESS NOTES
LOS: 0 days   Patient Care Team:  Ole Miranda MD as PCP - General (Internal Medicine)    Chief Complaint:  abscess    Subjective     Interval History:     Patient Complaints: reports her pain is much less than on admission, some occ bleeding from area, no fever, is eating and watching her diabetic diet.  History taken from: patient and chart    Review of Systems:    All systems were reviewed and negative except for:  Genitourinary: postivie for  abscess    Objective     Vital Signs  Vitals:    06/12/20 2024 06/13/20 0357 06/13/20 0924 06/13/20 1152   BP: 173/75 149/78 153/83 133/71   BP Location: Right arm Right arm Right arm Left arm   Patient Position: Lying Lying Sitting Sitting   Pulse: 79 74 82 78   Resp: 16 18  16   Temp: 97.8 °F (36.6 °C) 97.5 °F (36.4 °C)  97.6 °F (36.4 °C)   TempSrc: Oral Oral  Oral   SpO2: 99% 95% 98% 95%   Weight:       Height:           Physical Exam:     General Appearance:    Alert, cooperative, in no acute distress   Lungs:     Clear to auscultation,respirations regular, even and                   unlabored    Heart:    Regular rhythm and normal rate.   Breast Exam:    Deferred   Abdomen:     Normal bowel sounds, no masses, no organomegaly, soft        non-tender, non-distended, no guarding, no rebound                 tenderness   Genitalia:    Left vulvar abscess, no induration, pink about 5cm around, central area yellow no drainage seen, no blood seen, no extension into the labia above.   Extremities:   Moves all extremities well, no edema, no cyanosis, no             redness        Labs:  Lab Results (last 48 hours)     Procedure Component Value Units Date/Time    POC Glucose Once [[id number]]  (Abnormal) Collected:  06/13/20 1151    Specimen:  Blood Updated:  06/13/20 1157     Glucose 142 mg/dL      Comment: Serial Number: [id number]:  [id number]       BUN [[id number]]  (Normal) Collected:  06/13/20 0216    Specimen:  Blood Updated:  06/13/20 0822     BUN 18  mg/dL     POC Glucose Once [[id number]]  (Normal) Collected:  06/13/20 0719    Specimen:  Blood Updated:  06/13/20 0720     Glucose 104 mg/dL      Comment: Serial Number: [id number]:  [id number]       Vancomycin, Trough [[id number]]  (Normal) Collected:  06/13/20 0216    Specimen:  Blood Updated:  06/13/20 0247     Vancomycin Trough 8.00 mcg/mL     Basic Metabolic Panel [[id number]]  (Abnormal) Collected:  06/13/20 0216    Specimen:  Blood Updated:  06/13/20 0247     Glucose 70 mg/dL      BUN --     Comment: Testing performed by alternate method        Creatinine 1.01 mg/dL      Sodium 140 mmol/L      Potassium 3.3 mmol/L      Chloride 105 mmol/L      CO2 25.0 mmol/L      Calcium 9.3 mg/dL      eGFR Non African Amer 55 mL/min/1.73      BUN/Creatinine Ratio --     Comment: Testing not performed.        Anion Gap 10.0 mmol/L     Narrative:       GFR Normal >60  Chronic Kidney Disease <60  Kidney Failure <15      CBC & Differential [[id number]] Collected:  06/13/20 0215    Specimen:  Blood Updated:  06/13/20 0233    Narrative:       The following orders were created for panel order CBC & Differential.  Procedure                               Abnormality         Status                     ---------                               -----------         ------                     CBC Auto Differential[[id number]]        Abnormal            Final result                 Please view results for these tests on the individual orders.    CBC Auto Differential [[id number]]  (Abnormal) Collected:  06/13/20 0215    Specimen:  Blood Updated:  06/13/20 0233     WBC 7.90 10*3/mm3      RBC 3.85 10*6/mm3      Hemoglobin 11.6 g/dL      Hematocrit 34.4 %      MCV 89.4 fL      MCH 30.3 pg      MCHC 33.9 g/dL      RDW 15.3 %      RDW-SD 47.7 fl      MPV 8.4 fL      Platelets 234 10*3/mm3      Neutrophil % 54.0 %      Lymphocyte % 33.0 %      Monocyte % 6.5 %      Eosinophil % 5.2 %      Basophil % 1.3 %      Neutrophils, Absolute 4.30 10*3/mm3       Lymphocytes, Absolute 2.60 10*3/mm3      Monocytes, Absolute 0.50 10*3/mm3      Eosinophils, Absolute 0.40 10*3/mm3      Basophils, Absolute 0.10 10*3/mm3      nRBC 0.1 /100 WBC     Blood Culture - Blood, Arm, Right [[id number]] Collected:  06/10/20 0035    Specimen:  Blood from Arm, Right Updated:  06/13/20 0046     Blood Culture No growth at 3 days    Blood Culture - Blood, Arm, Left [[id number]] Collected:  06/10/20 0035    Specimen:  Blood from Arm, Left Updated:  06/13/20 0046     Blood Culture No growth at 3 days    POC Glucose Once [[id number]]  (Normal) Collected:  06/12/20 1659    Specimen:  Blood Updated:  06/12/20 1702     Glucose 93 mg/dL      Comment: Serial Number: [id number]:  [id number]       POC Glucose Once [[id number]]  (Normal) Collected:  06/12/20 1121    Specimen:  Blood Updated:  06/12/20 1130     Glucose 99 mg/dL      Comment: Serial Number: [id number]:  [id number]       POC Glucose Once [[id number]]  (Abnormal) Collected:  06/12/20 0724    Specimen:  Blood Updated:  06/12/20 0726     Glucose 129 mg/dL      Comment: Serial Number: [id number]:  [id number]       Wound Culture - Wound, Groin [[id number]]  (Abnormal)  (Susceptibility) Collected:  06/10/20 0035    Specimen:  Wound from Groin Updated:  06/12/20 0708     Wound Culture Scant growth (1+) Staphylococcus aureus, MRSA     Comment:   Methicillin resistant Staphylococcus aureus, Patient may be an isolation risk.        Gram Stain Few (2+) WBCs per low power field      Few (2+) Gram positive cocci    Susceptibility      Staphylococcus aureus, MRSA     RAMESH     Clindamycin Susceptible     Erythromycin Intermediate     Inducible Clindamycin Resistance Negative     Oxacillin Resistant     Penicillin G Resistant     Rifampin Susceptible     Tetracycline Susceptible     Trimethoprim + Sulfamethoxazole Resistant     Vancomycin Susceptible                Susceptibility Comments     Staphylococcus aureus, MRSA    This isolate does  not demonstrate inducible clindamycin resistance in vitro.               POC Glucose Once [[id number]]  (Normal) Collected:  06/11/20 1925    Specimen:  Blood Updated:  06/11/20 1925     Glucose 93 mg/dL      Comment: Serial Number: [id number]:  [id number]       POC Glucose Once [[id number]]  (Normal) Collected:  06/11/20 1649    Specimen:  Blood Updated:  06/11/20 1650     Glucose 87 mg/dL      Comment: Serial Number: [id number]:  [id number]       Hemoglobin A1c [[id number]]  (Abnormal) Collected:  06/10/20 0047    Specimen:  Blood Updated:  06/11/20 1640     Hemoglobin A1C 6.3 %     Narrative:       Hemoglobin A1C Reference Range:    <5.7 %        Normal  5.7-6.4 %     Increased risk for diabetes  > 6.4 %        Diabetes       These guidelines have been recommended by the American Diabetic Association for Hgb A1c.      The following 2010 guidelines have been recommended by the American Diabetes Association for Hemoglobin A1c.    HBA1c 5.7-6.4% Increased risk for future diabetes (pre-diabetes)  HBA1c     >6.4% Diabetes            Radiology:  Imaging Results (Last 48 Hours)     ** No results found for the last 48 hours. **            Assessment/Plan       Abscess of groin, left      Abscess looks good, this is my first day seeing her, but clinically she looks to be on the mend. I think IV abx should continue one more day and possibly able to switch to PO Clindamycin tomorrow or Monday.  Discussed importance of keeping blood sugars controlled to patient.  Will follow.  Thank you for the consult.    Antonia Szymanski MD  06/13/20  14:15

## 2020-06-13 NOTE — PLAN OF CARE
Patient is feeling much better today and abscess is looking much better.  No pain medication given today, patient has been up and about in her room.  Possible discharge tomorrow.  Continue to monitor.

## 2020-06-13 NOTE — PLAN OF CARE
Problem: Patient Care Overview  Goal: Plan of Care Review  Outcome: Ongoing (interventions implemented as appropriate)  Flowsheets (Taken 6/13/2020 3483)  Progress: no change  Plan of Care Reviewed With: patient  Outcome Summary: Patient in bed eyes closed resting.  No c/o noted.  Continues on IV ABT's.

## 2020-06-14 VITALS
HEART RATE: 77 BPM | HEIGHT: 65 IN | RESPIRATION RATE: 16 BRPM | TEMPERATURE: 97.6 F | BODY MASS INDEX: 34.52 KG/M2 | WEIGHT: 207.2 LBS | DIASTOLIC BLOOD PRESSURE: 74 MMHG | OXYGEN SATURATION: 97 % | SYSTOLIC BLOOD PRESSURE: 173 MMHG

## 2020-06-14 PROBLEM — E11.9 DIABETES: Status: ACTIVE | Noted: 2020-06-14

## 2020-06-14 PROBLEM — I10 HYPERTENSION: Status: ACTIVE | Noted: 2020-06-14

## 2020-06-14 LAB
ANION GAP SERPL CALCULATED.3IONS-SCNC: 10 MMOL/L (ref 5–15)
BASOPHILS # BLD AUTO: 0 10*3/MM3 (ref 0–0.2)
BASOPHILS NFR BLD AUTO: 0.1 % (ref 0–1.5)
BUN BLD-MCNC: 21 MG/DL (ref 8–23)
BUN BLD-MCNC: ABNORMAL MG/DL
BUN/CREAT SERPL: ABNORMAL
CALCIUM SPEC-SCNC: 9.5 MG/DL (ref 8.6–10.5)
CHLORIDE SERPL-SCNC: 103 MMOL/L (ref 98–107)
CO2 SERPL-SCNC: 25 MMOL/L (ref 22–29)
CREAT BLD-MCNC: 1.01 MG/DL (ref 0.57–1)
DEPRECATED RDW RBC AUTO: 46.8 FL (ref 37–54)
EOSINOPHIL # BLD AUTO: 0.3 10*3/MM3 (ref 0–0.4)
EOSINOPHIL NFR BLD AUTO: 3.5 % (ref 0.3–6.2)
ERYTHROCYTE [DISTWIDTH] IN BLOOD BY AUTOMATED COUNT: 15 % (ref 12.3–15.4)
GFR SERPL CREATININE-BSD FRML MDRD: 55 ML/MIN/1.73
GLUCOSE BLD-MCNC: 91 MG/DL (ref 65–99)
GLUCOSE BLDC GLUCOMTR-MCNC: 139 MG/DL (ref 70–105)
GLUCOSE BLDC GLUCOMTR-MCNC: 170 MG/DL (ref 70–105)
GLUCOSE BLDC GLUCOMTR-MCNC: 99 MG/DL (ref 70–105)
HCT VFR BLD AUTO: 37.6 % (ref 34–46.6)
HGB BLD-MCNC: 12.7 G/DL (ref 12–15.9)
LYMPHOCYTES # BLD AUTO: 3 10*3/MM3 (ref 0.7–3.1)
LYMPHOCYTES NFR BLD AUTO: 32.1 % (ref 19.6–45.3)
MAGNESIUM SERPL-MCNC: 1.9 MG/DL (ref 1.6–2.4)
MCH RBC QN AUTO: 30.4 PG (ref 26.6–33)
MCHC RBC AUTO-ENTMCNC: 33.8 G/DL (ref 31.5–35.7)
MCV RBC AUTO: 89.8 FL (ref 79–97)
MONOCYTES # BLD AUTO: 0.6 10*3/MM3 (ref 0.1–0.9)
MONOCYTES NFR BLD AUTO: 6.5 % (ref 5–12)
NEUTROPHILS # BLD AUTO: 5.5 10*3/MM3 (ref 1.7–7)
NEUTROPHILS NFR BLD AUTO: 57.8 % (ref 42.7–76)
NRBC BLD AUTO-RTO: 0.1 /100 WBC (ref 0–0.2)
PLATELET # BLD AUTO: 249 10*3/MM3 (ref 140–450)
PMV BLD AUTO: 8.6 FL (ref 6–12)
POTASSIUM BLD-SCNC: 3.8 MMOL/L (ref 3.5–5.2)
RBC # BLD AUTO: 4.19 10*6/MM3 (ref 3.77–5.28)
SODIUM BLD-SCNC: 138 MMOL/L (ref 136–145)
WBC NRBC COR # BLD: 9.5 10*3/MM3 (ref 3.4–10.8)

## 2020-06-14 PROCEDURE — 85025 COMPLETE CBC W/AUTO DIFF WBC: CPT | Performed by: HOSPITALIST

## 2020-06-14 PROCEDURE — 82962 GLUCOSE BLOOD TEST: CPT

## 2020-06-14 PROCEDURE — G0378 HOSPITAL OBSERVATION PER HR: HCPCS

## 2020-06-14 PROCEDURE — 80048 BASIC METABOLIC PNL TOTAL CA: CPT | Performed by: HOSPITALIST

## 2020-06-14 PROCEDURE — 83735 ASSAY OF MAGNESIUM: CPT | Performed by: HOSPITALIST

## 2020-06-14 PROCEDURE — 99217 PR OBSERVATION CARE DISCHARGE MANAGEMENT: CPT | Performed by: HOSPITALIST

## 2020-06-14 RX ORDER — CLINDAMYCIN HYDROCHLORIDE 300 MG/1
300 CAPSULE ORAL 3 TIMES DAILY
Qty: 30 CAPSULE | Refills: 0 | Status: SHIPPED | OUTPATIENT
Start: 2020-06-14 | End: 2020-06-24

## 2020-06-14 RX ADMIN — ROPINIROLE 2 MG: 1 TABLET, FILM COATED ORAL at 08:26

## 2020-06-14 RX ADMIN — FOLIC ACID 1 MG: 1 TABLET ORAL at 08:26

## 2020-06-14 RX ADMIN — BUMETANIDE 2 MG: 1 TABLET ORAL at 08:25

## 2020-06-14 RX ADMIN — Medication 5000 UNITS: at 08:26

## 2020-06-14 RX ADMIN — FEBUXOSTAT 80 MG: 40 TABLET ORAL at 08:25

## 2020-06-14 RX ADMIN — METOPROLOL TARTRATE 50 MG: 50 TABLET, FILM COATED ORAL at 08:26

## 2020-06-14 RX ADMIN — ALLOPURINOL 300 MG: 300 TABLET ORAL at 08:25

## 2020-06-14 RX ADMIN — GLIPIZIDE 20 MG: 5 TABLET ORAL at 08:26

## 2020-06-14 NOTE — PLAN OF CARE
Problem: Patient Care Overview  Goal: Plan of Care Review  Outcome: Ongoing (interventions implemented as appropriate)  Flowsheets  Taken 6/14/2020 0404  Progress: improving  Outcome Summary: Patient slept well through the night. No c/o pain. IV abx administered as scheduled. Patient hopes to discharge home today on oral abx.  Taken 6/13/2020 1901  Plan of Care Reviewed With: patient

## 2020-06-14 NOTE — DISCHARGE SUMMARY
[facility]  DISCHARGE SUMMARY        Prepared For PCP:  Ole Miranda MD    Patient Name: Leslee Casper  : 1950  MRN: [id number]      Date of Admission:   6/10/2020    Date of Discharge:  2020    Length of stay:  LOS: 0 days     Hospital Course     Presenting Problem:   Abscess of groin, left [L02.214]      Active Hospital Problems    Diagnosis  POA   • Diabetes (CMS/HCC) [E11.9]  Yes   • Essential hypertension [I10]  Yes   • Abscess of groin, left [L02.214]  Yes      Resolved Hospital Problems   No resolved problems to display.     Assessment and plan:  Left labial abscess with cellulitis secondary to MRSA, spontaneously draining  -Received IV vancomycin in the hospital and will be discharged on clindamycin  -GYN consulted    Leukocytosis secondary to above, resolved     DM2 chronic and controlled  -Continue Bydureon on Tuesday and glipizide  -LCS diet      Essential HTN chronic and controlled  -Continue Bumex and metoprolol     Sebaceous cyst left posterior axillary area without signs of infection  -Patient counseled and reassured     Gout not in exacerbation - on febustat and allopurinol      HLD- on Ezetimibe     GERD- on Aciphex     RLS- on Ropinirole     Diet supp- on folic acid and Vit D3      CAD prophylaxis - on aspirin 81 mg daily      Obesity BMI 34.9- lifestyle management education          Hospital Course:  Leslee Casper is a 69 y.o. female who presented to the emergency room complaining of fever at home.  She reports that she has had a left labial wound for a couple of weeks that is painful and not improved with oral antibiotics.  She had leukocytosis with white count of 13,000 and normal lactic acid.  Patient reports that she has had a history of MRSA abscess on her face.  She was started on IV vancomycin and Unasyn and admitted.  The patient's culture grew MRSA.  White blood cell count normalized and she has been afebrile.     Date:  6/11/2020: Patient reports that her pain and swelling in her left labial area is 50% improved compared to when she came in.  She denies any associated complaints at this time.  She did ask about a nodule that she is noticed on her left posterior axillary area of her back for several months.  Gynecology saw the patient and recommended continuing IV antibiotics for 48 more hours and then consider general surgery consult if needed for debridement because of the extensiveness of the area involved.  6/12/2020: Patient reports decreased pain and swelling in her left labial area but she is noticed some increased nodularity to the central portion.  6/13/2020: The patient has continued decreased pain in the area of the abscess in the left labia.  6/14/2020: The patient reports significant improvement with no further pain and continued decreased swelling of her left labial wound.  She reports just some minor itching.  She reports loose stools which is chronic for her.    I spoke with Dr. Szymanski.  The patient is now felt to be stable for discharge.        Recommendation for Outpatient Providers:     Continue to encourage adequate compliance and tight glycemic control.      Reasons For Change In Medications and Indications for New Medications:    Cleocin for MRSA abscess of the left labia.    Day of Discharge     HPI: No current complaints.      Vital Signs:   Temp:  [97.6 °F (36.4 °C)-97.8 °F (36.6 °C)] 97.6 °F (36.4 °C)  Heart Rate:  [77-79] 77  Resp:  [16] 16  BP: (133-173)/(71-74) 173/74     Physical Exam:  Physical Exam well-developed well-nourished female walking around the room awake and alert no acute distress; skin warm and dry; mood and affect normal; no lower extremity edema; no Tilley catheter.  Wound not examined (gynecologist was just in the room to examine the patient and I spoke with her).    Pertinent  and/or Most Recent Results     Results from last 7 days   Lab Units 06/14/20  0457 06/13/20  0213  06/13/20  0215 06/11/20  0206 06/10/20  0047   WBC 10*3/mm3 9.50  --  7.90 6.60 13.00*   HEMOGLOBIN g/dL 12.7  --  11.6* 11.4* 12.6   HEMATOCRIT % 37.6  --  34.4 33.9* 38.1   PLATELETS 10*3/mm3 249  --  234 189 228   SODIUM mmol/L 138 140  --  139 136   POTASSIUM mmol/L 3.8 3.3*  --  3.6 3.7   CHLORIDE mmol/L 103 105  --  106 102   CO2 mmol/L 25.0 25.0  --  23.0 22.0   BUN   --  18  --  16 25*   CREATININE mg/dL 1.01* 1.01*  --  1.07* 1.46*   GLUCOSE mg/dL 91 70  --  100* 111*   CALCIUM mg/dL 9.5 9.3  --  8.9 9.3     Results from last 7 days   Lab Units 06/11/20 0206 06/10/20  0047   BILIRUBIN mg/dL  --  0.4   ALK PHOS U/L  --  155*   ALT (SGPT) U/L  --  37*   AST (SGOT) U/L  --  27   PROTIME Seconds 11.0  --    INR  1.06  --    APTT seconds 24.2  --            Invalid input(s): TG, LDLCALC, LDLREALC  Results from last 7 days   Lab Units 06/10/20  0050 06/10/20  0047   HEMOGLOBIN A1C %  --  6.3*   LACTATE mmol/L 0.8  --        Brief Urine Lab Results  (Last result in the past 365 days)      Color   Clarity   Blood   Leuk Est   Nitrite   Protein   CREAT   Urine HCG        06/10/20 0220 Dark Yellow Clear Negative Negative Negative Trace               Microbiology Results Abnormal     Procedure Component Value - Date/Time    Blood Culture - Blood, Arm, Left [[id number]] Collected:  06/10/20 0035    Lab Status:  Preliminary result Specimen:  Blood from Arm, Left Updated:  06/14/20 0045     Blood Culture No growth at 4 days    Blood Culture - Blood, Arm, Right [[id number]] Collected:  06/10/20 0035    Lab Status:  Preliminary result Specimen:  Blood from Arm, Right Updated:  06/14/20 0045     Blood Culture No growth at 4 days    Wound Culture - Wound, Groin [[id number]]  (Abnormal)  (Susceptibility) Collected:  06/10/20 0035    Lab Status:  Final result Specimen:  Wound from Groin Updated:  06/12/20 0708     Wound Culture Scant growth (1+) Staphylococcus aureus, MRSA     Comment:   Methicillin resistant Staphylococcus  aureus, Patient may be an isolation risk.        Gram Stain Few (2+) WBCs per low power field      Few (2+) Gram positive cocci    Susceptibility      Staphylococcus aureus, MRSA     RAMESH     Clindamycin Susceptible     Erythromycin Intermediate     Inducible Clindamycin Resistance Negative     Oxacillin Resistant     Penicillin G Resistant     Rifampin Susceptible     Tetracycline Susceptible     Trimethoprim + Sulfamethoxazole Resistant     Vancomycin Susceptible                Susceptibility Comments     Staphylococcus aureus, MRSA    This isolate does not demonstrate inducible clindamycin resistance in vitro.               Urine Culture - Urine, Urine, Clean Catch [[id number]]  (Normal) Collected:  06/10/20 0103    Lab Status:  Final result Specimen:  Urine, Clean Catch Updated:  06/11/20 0859     Urine Culture No growth    COVID-19, ABBOTT IN-HOUSE,NP Swab (NO TRANSPORT MEDIA) 2 HR TAT - Swab, Nasopharynx [[id number]]  (Normal) Collected:  06/10/20 0047    Lab Status:  Final result Specimen:  Swab from Nasopharynx Updated:  06/10/20 0124     COVID19 Not Detected                                      Test Results Pending at Discharge   Order Current Status    BUN In process    Blood Culture - Blood, Arm, Left Preliminary result    Blood Culture - Blood, Arm, Right Preliminary result            Procedures Performed           Consults:   Consults     Date and Time Order Name Status Description    6/10/2020 1624 Inpatient Gynecology Consult Completed     6/10/2020 0352 Inpatient Hospitalist Consult Completed             Discharge Details        Discharge Medications      New Medications      Instructions Start Date   clindamycin 300 MG capsule  Commonly known as:  Cleocin   300 mg, Oral, 3 Times Daily         Continue These Medications      Instructions Start Date   allopurinol 300 MG tablet  Commonly known as:  ZYLOPRIM   300 mg, Oral, Daily      aspirin 81 MG EC tablet   81 mg, Oral, 3 Times Weekly, Monday,  Wednesday, Friday      bumetanide 2 MG tablet  Commonly known as:  BUMEX   2 mg, Oral, Daily      exenatide er 2 MG/0.85ML auto-injector injection  Commonly known as:  BYDUREON   2 mg, Subcutaneous, Weekly, Tuesday       ezetimibe 10 MG tablet  Commonly known as:  ZETIA   10 mg, Oral, Daily      febuxostat 80 MG tablet tablet  Commonly known as:  ULORIC   Oral, Daily      folic acid 1 MG tablet  Commonly known as:  FOLVITE   1 mg, Oral, Daily      glipizide 10 MG tablet  Commonly known as:  GLUCOTROL   20 mg, Oral, 2 Times Daily Before Meals      metoprolol tartrate 50 MG tablet  Commonly known as:  LOPRESSOR   50 mg, Oral, 2 Times Daily      RABEprazole 20 MG EC tablet  Commonly known as:  ACIPHEX   20 mg, Oral, Daily      rOPINIRole 2 MG tablet  Commonly known as:  REQUIP   2 mg, Oral, 3 Times Daily      vitamin D3 125 MCG (5000 UT) capsule capsule   5,000 Units, Oral, 2 times daily             No Known Allergies      Discharge Disposition:  Home or Self Care    Diet:  Hospital:  Diet Order   Procedures   • Diet Diabetic/Consistent Carbs; Diabetic - Consistent Carb         Discharge Activity:   Activity Instructions     Activity as Tolerated              CODE STATUS:    Code Status and Medical Interventions:   Ordered at: 06/10/20 0411     Code Status:    CPR     Medical Interventions (Level of Support Prior to Arrest):    Full         Follow-up Appointments  No future appointments.    Additional Instructions for the Follow-ups that You Need to Schedule     Call MD With Problems / Concerns   As directed      Instructions: Call [phone] or email [email or web address] for problems or concerns.    Order Comments:  Instructions: Call [phone] or email [email or web address] for problems or concerns.          Discharge Follow-up with PCP   As directed       Currently Documented PCP:    Ole Miranda MD    PCP Phone Number:    [phone]     Follow Up Details:  2-3 business days                  Condition on Discharge:      Stable      This patient has been examined wearing appropriate Personal Protective Equipment  06/14/20      Electronically signed by Victoria Ledbetter MD, 06/14/20, 11:12 AM.      Time: I spent  30  minutes on this discharge activity which included face-to-face encounter with the patient/reviewing the data in the system/coordination of the care with the nursing staff as well as consultants/documentation/entering orders.

## 2020-06-15 LAB
BACTERIA SPEC AEROBE CULT: NORMAL
BACTERIA SPEC AEROBE CULT: NORMAL

## 2020-06-15 NOTE — PROGRESS NOTES
Discharge Planning Assessment  [facility]     Patient Name: Leslee Casper  MRN: [id number]  Today's Date: 6/15/2020    Admit Date: 6/10/2020          Plan    Final Discharge Disposition Code  01 - home or self-care    Final Note  return home       Carol naegele rn  Case management  Office number [phone]  Cell phone [phone]

## 2021-10-13 ENCOUNTER — HOSPITAL ENCOUNTER (EMERGENCY)
Facility: HOSPITAL | Age: 71
Discharge: HOME | End: 2021-10-13
Admitting: EMERGENCY MEDICINE

## 2021-10-13 ENCOUNTER — APPOINTMENT (OUTPATIENT)
Dept: GENERAL RADIOLOGY | Facility: HOSPITAL | Age: 71
End: 2021-10-13

## 2021-10-13 VITALS
BODY MASS INDEX: 37.8 KG/M2 | RESPIRATION RATE: 18 BRPM | HEIGHT: 65 IN | DIASTOLIC BLOOD PRESSURE: 60 MMHG | HEART RATE: 62 BPM | SYSTOLIC BLOOD PRESSURE: 154 MMHG | WEIGHT: 226.85 LBS | TEMPERATURE: 97.9 F | OXYGEN SATURATION: 96 %

## 2021-10-13 DIAGNOSIS — R06.02 SHORTNESS OF BREATH: Primary | ICD-10-CM

## 2021-10-13 DIAGNOSIS — J18.9 PNEUMONIA OF LEFT LOWER LOBE DUE TO INFECTIOUS ORGANISM: ICD-10-CM

## 2021-10-13 LAB
ALBUMIN SERPL-MCNC: 3.8 G/DL (ref 3.5–5.2)
ALBUMIN/GLOB SERPL: 1.4 G/DL
ALP SERPL-CCNC: 131 U/L (ref 39–117)
ALT SERPL W P-5'-P-CCNC: 34 U/L (ref 1–33)
ANION GAP SERPL CALCULATED.3IONS-SCNC: 13 MMOL/L (ref 5–15)
APTT PPP: 20.7 SECONDS (ref 24–31)
AST SERPL-CCNC: 33 U/L (ref 1–32)
BASOPHILS # BLD AUTO: 0.1 10*3/MM3 (ref 0–0.2)
BASOPHILS NFR BLD AUTO: 0.8 % (ref 0–1.5)
BILIRUB SERPL-MCNC: 0.4 MG/DL (ref 0–1.2)
BUN SERPL-MCNC: 21 MG/DL (ref 8–23)
BUN/CREAT SERPL: 22.1 (ref 7–25)
CALCIUM SPEC-SCNC: 8.7 MG/DL (ref 8.6–10.5)
CHLORIDE SERPL-SCNC: 105 MMOL/L (ref 98–107)
CO2 SERPL-SCNC: 19 MMOL/L (ref 22–29)
CREAT SERPL-MCNC: 0.95 MG/DL (ref 0.57–1)
D DIMER PPP FEU-MCNC: 0.57 MG/L (FEU) (ref 0–0.59)
DEPRECATED RDW RBC AUTO: 47.7 FL (ref 37–54)
EOSINOPHIL # BLD AUTO: 0.2 10*3/MM3 (ref 0–0.4)
EOSINOPHIL NFR BLD AUTO: 2.9 % (ref 0.3–6.2)
ERYTHROCYTE [DISTWIDTH] IN BLOOD BY AUTOMATED COUNT: 15.8 % (ref 12.3–15.4)
GFR SERPL CREATININE-BSD FRML MDRD: 58 ML/MIN/1.73
GLOBULIN UR ELPH-MCNC: 2.7 GM/DL
GLUCOSE SERPL-MCNC: 176 MG/DL (ref 65–99)
HCT VFR BLD AUTO: 34.3 % (ref 34–46.6)
HGB BLD-MCNC: 11.5 G/DL (ref 12–15.9)
HOLD SPECIMEN: NORMAL
HOLD SPECIMEN: NORMAL
INR PPP: 0.98 (ref 0.93–1.1)
LYMPHOCYTES # BLD AUTO: 1.8 10*3/MM3 (ref 0.7–3.1)
LYMPHOCYTES NFR BLD AUTO: 23.6 % (ref 19.6–45.3)
MCH RBC QN AUTO: 28.7 PG (ref 26.6–33)
MCHC RBC AUTO-ENTMCNC: 33.6 G/DL (ref 31.5–35.7)
MCV RBC AUTO: 85.7 FL (ref 79–97)
MONOCYTES # BLD AUTO: 0.5 10*3/MM3 (ref 0.1–0.9)
MONOCYTES NFR BLD AUTO: 6.1 % (ref 5–12)
NEUTROPHILS NFR BLD AUTO: 5 10*3/MM3 (ref 1.7–7)
NEUTROPHILS NFR BLD AUTO: 66.6 % (ref 42.7–76)
NRBC BLD AUTO-RTO: 0.1 /100 WBC (ref 0–0.2)
NT-PROBNP SERPL-MCNC: 889 PG/ML (ref 0–900)
PLATELET # BLD AUTO: 150 10*3/MM3 (ref 140–450)
PMV BLD AUTO: 9.2 FL (ref 6–12)
POTASSIUM SERPL-SCNC: 4.9 MMOL/L (ref 3.5–5.2)
PROCALCITONIN SERPL-MCNC: 0.08 NG/ML (ref 0–0.25)
PROT SERPL-MCNC: 6.5 G/DL (ref 6–8.5)
PROTHROMBIN TIME: 10.9 SECONDS (ref 9.6–11.7)
QT INTERVAL: 364 MS
RBC # BLD AUTO: 4 10*6/MM3 (ref 3.77–5.28)
SARS-COV-2 RNA PNL SPEC NAA+PROBE: NOT DETECTED
SODIUM SERPL-SCNC: 137 MMOL/L (ref 136–145)
TROPONIN T SERPL-MCNC: <0.01 NG/ML (ref 0–0.03)
WBC # BLD AUTO: 7.5 10*3/MM3 (ref 3.4–10.8)
WHOLE BLOOD HOLD SPECIMEN: NORMAL
WHOLE BLOOD HOLD SPECIMEN: NORMAL

## 2021-10-13 PROCEDURE — U0005 INFEC AGEN DETEC AMPLI PROBE: HCPCS | Performed by: EMERGENCY MEDICINE

## 2021-10-13 PROCEDURE — 80053 COMPREHEN METABOLIC PANEL: CPT | Performed by: NURSE PRACTITIONER

## 2021-10-13 PROCEDURE — 99284 EMERGENCY DEPT VISIT MOD MDM: CPT

## 2021-10-13 PROCEDURE — 71045 X-RAY EXAM CHEST 1 VIEW: CPT

## 2021-10-13 PROCEDURE — 83880 ASSAY OF NATRIURETIC PEPTIDE: CPT | Performed by: NURSE PRACTITIONER

## 2021-10-13 PROCEDURE — 85025 COMPLETE CBC W/AUTO DIFF WBC: CPT | Performed by: NURSE PRACTITIONER

## 2021-10-13 PROCEDURE — 84145 PROCALCITONIN (PCT): CPT | Performed by: NURSE PRACTITIONER

## 2021-10-13 PROCEDURE — 85379 FIBRIN DEGRADATION QUANT: CPT | Performed by: NURSE PRACTITIONER

## 2021-10-13 PROCEDURE — 25010000002 FUROSEMIDE PER 20 MG: Performed by: NURSE PRACTITIONER

## 2021-10-13 PROCEDURE — 96374 THER/PROPH/DIAG INJ IV PUSH: CPT

## 2021-10-13 PROCEDURE — U0003 INFECTIOUS AGENT DETECTION BY NUCLEIC ACID (DNA OR RNA); SEVERE ACUTE RESPIRATORY SYNDROME CORONAVIRUS 2 (SARS-COV-2) (CORONAVIRUS DISEASE [COVID-19]), AMPLIFIED PROBE TECHNIQUE, MAKING USE OF HIGH THROUGHPUT TECHNOLOGIES AS DESCRIBED BY CMS-2020-01-R: HCPCS | Performed by: EMERGENCY MEDICINE

## 2021-10-13 PROCEDURE — 84484 ASSAY OF TROPONIN QUANT: CPT | Performed by: NURSE PRACTITIONER

## 2021-10-13 PROCEDURE — 85610 PROTHROMBIN TIME: CPT | Performed by: NURSE PRACTITIONER

## 2021-10-13 PROCEDURE — 93005 ELECTROCARDIOGRAM TRACING: CPT

## 2021-10-13 PROCEDURE — 85730 THROMBOPLASTIN TIME PARTIAL: CPT | Performed by: NURSE PRACTITIONER

## 2021-10-13 RX ORDER — SODIUM CHLORIDE 0.9 % (FLUSH) 0.9 %
10 SYRINGE (ML) INJECTION AS NEEDED
Status: DISCONTINUED | OUTPATIENT
Start: 2021-10-13 | End: 2021-10-13 | Stop reason: HOSPADM

## 2021-10-13 RX ORDER — DOXYCYCLINE HYCLATE 100 MG
100 TABLET ORAL 2 TIMES DAILY
Qty: 14 TABLET | Refills: 0 | Status: SHIPPED | OUTPATIENT
Start: 2021-10-13 | End: 2021-10-20

## 2021-10-13 RX ORDER — FUROSEMIDE 10 MG/ML
40 INJECTION INTRAMUSCULAR; INTRAVENOUS ONCE
Status: COMPLETED | OUTPATIENT
Start: 2021-10-13 | End: 2021-10-13

## 2021-10-13 RX ORDER — AMOXICILLIN AND CLAVULANATE POTASSIUM 875; 125 MG/1; MG/1
1 TABLET, FILM COATED ORAL 2 TIMES DAILY
Qty: 14 TABLET | Refills: 0 | Status: SHIPPED | OUTPATIENT
Start: 2021-10-13 | End: 2021-10-20

## 2021-10-13 RX ADMIN — FUROSEMIDE 40 MG: 10 INJECTION, SOLUTION INTRAMUSCULAR; INTRAVENOUS at 09:21

## 2021-10-13 NOTE — ED PROVIDER NOTES
"Subjective   Patient is a 71-year-old white female with history of hypertension and diabetes who presents today with complaints of shortness of breath.  She states her shortness of breath seems to be worse at night when she tries to lie down.  She states she has had this for the last couple of nights.  She does report a mild sometimes productive cough with green sputum.  She does also report that her cough is worse at night.  She denies any fever chills loss of taste or smell.  She denies chest pain.  She denies any abdominal pain nausea vomiting or diarrhea.  She denies black or bloody stools.  She does report that she has some mild shortness of breath with exertion but states this is not unusual for her.  She does report some increased swelling in both of her legs however she also states that she has not taken her \"water pills\" recently.  She denies any ill contacts.          Review of Systems   Constitutional: Negative for chills and fever.   HENT: Positive for congestion.    Respiratory: Positive for cough and shortness of breath.    Cardiovascular: Positive for leg swelling. Negative for chest pain.   Gastrointestinal: Negative for abdominal pain, blood in stool, diarrhea, nausea and vomiting.       Past Medical History:   Diagnosis Date   • Diabetes (CMS/HCC)    • Gout    • Hypertension        No Known Allergies    Past Surgical History:   Procedure Laterality Date   • CHOLECYSTECTOMY     • HYSTERECTOMY     • TONSILLECTOMY         Family History   Family history unknown: Yes       Social History     Socioeconomic History   • Marital status:    Tobacco Use   • Smoking status: Never Smoker   Substance and Sexual Activity   • Alcohol use: Never   • Drug use: Never           Objective   Physical Exam  Vital signs and triage nurse note reviewed.  Constitutional: Awake, alert; well-developed and well-nourished. No acute distress is noted.  Obese.  HEENT: Normocephalic, atraumatic; pupils are PERRL with intact " EOM; oropharynx is pink and moist without exudate or erythema.  No drooling or pooling of oral secretions.  Neck: Supple, full range of motion without pain; no cervical lymphadenopathy. Normal phonation.  Cardiovascular: Regular rate and rhythm, normal S1-S2.  No murmur noted.  Pulmonary: Respiratory effort regular nonlabored, breath sounds crackles are noted to the left lower lung field.  Abdomen: Soft, nontender, nondistended with normoactive bowel sounds; no rebound or guarding.  Musculoskeletal: Independent range of motion of all extremities with no palpable tenderness.  1+ pitting edema noted to both lower extremities.  There is no erythema.  No calf tenderness.  Negative Theresa.  Neuro: Alert oriented x3, speech is clear and appropriate, GCS 15.    Skin: Flesh tone, warm, dry, intact; no erythematous or petechial rash or lesion.      Procedures           ED Course      Labs Reviewed   COMPREHENSIVE METABOLIC PANEL - Abnormal; Notable for the following components:       Result Value    Glucose 176 (*)     CO2 19.0 (*)     ALT (SGPT) 34 (*)     AST (SGOT) 33 (*)     Alkaline Phosphatase 131 (*)     eGFR Non  Amer 58 (*)     All other components within normal limits    Narrative:     GFR Normal >60  Chronic Kidney Disease <60  Kidney Failure <15     APTT - Abnormal; Notable for the following components:    PTT 20.7 (*)     All other components within normal limits   CBC WITH AUTO DIFFERENTIAL - Abnormal; Notable for the following components:    Hemoglobin 11.5 (*)     RDW 15.8 (*)     All other components within normal limits   COVID-19,CEPHEID/SLOAN/BDMAX,COR/VIANCA/PAD/RANDELL IN-HOUSE,NP SWAB IN TRANSPORT MEDIA 3-4 HR TAT, RT-PCR - Normal    Narrative:     Fact sheet for providers: https://www.fda.gov/media/368557/download     Fact sheet for patients: https://www.fda.gov/media/251314/download  Fact sheet for providers: https://www.fda.gov/media/101072/download     Fact sheet for patients:  https://www.fda.gov/media/926586/download   PROTIME-INR - Normal   BNP (IN-HOUSE) - Normal    Narrative:     Among patients with dyspnea, NT-proBNP is highly sensitive for the detection of acute congestive heart failure. In addition NT-proBNP of <300 pg/ml effectively rules out acute congestive heart failure with 99% negative predictive value.    Results may be falsely decreased if patient taking Biotin.     TROPONIN (IN-HOUSE) - Normal    Narrative:     Troponin T Reference Range:  <= 0.03 ng/mL-   Negative for AMI  >0.03 ng/mL-     Abnormal for myocardial necrosis.  Clinicians would have to utilize clinical acumen, EKG, Troponin and serial changes to determine if it is an Acute Myocardial Infarction or myocardial injury due to an underlying chronic condition.       Results may be falsely decreased if patient taking Biotin.     D-DIMER, QUANTITATIVE - Normal    Narrative:     Reference Range  --------------------------------------------------------------------     < 0.50   Negative Predictive Value  0.50-0.59   Indeterminate    >= 0.60   Probable VTE             A very low percentage of patients with DVT may yield D-Dimer results   below the cut-off of 0.50 mg/L FEU.  This is known to be more   prevalent in patients with distal DVT.             Results of this test should always be interpreted in conjunction with   the patient's medical history, clinical presentation and other   findings.  Clinical diagnosis should not be based on the result of   INNOVANCE D-Dimer alone.   PROCALCITONIN - Normal    Narrative:     As a Marker for Sepsis (Non-Neonates):     1. <0.5 ng/mL represents a low risk of severe sepsis and/or septic shock.  2. >2 ng/mL represents a high risk of severe sepsis and/or septic shock.    As a Marker for Lower Respiratory Tract Infections that require antibiotic therapy:  PCT on Admission     Antibiotic Therapy             6-12 Hrs later  >0.5                          Strongly Recommended           "  >0.25 - <0.5             Recommended  0.1 - 0.25                  Discouraged                       Remeasure/reassess PCT  <0.1                         Strongly Discouraged         Remeasure/reassess PCT      As 28 day mortality risk marker: \"Change in Procalcitonin Result\" (>80% or <=80%) if Day 0 (or Day 1) and Day 4 values are available. Refer to http://www.[facility] [email or web address] [facility] – EnidRelevant Mediapct-calculator.com/    Change in PCT <=80 %   A decrease of PCT levels below or equal to 80% defines a positive change in PCT test result representing a higher risk for 28-day all-cause mortality of patients diagnosed with severe sepsis or septic shock.    Change in PCT >80 %   A decrease of PCT levels of more than 80% defines a negative change in PCT result representing a lower risk for 28-day all-cause mortality of patients diagnosed with severe sepsis or septic shock.               COVID PRE-OP / PRE-PROCEDURE SCREENING ORDER (NO ISOLATION)    Narrative:     The following orders were created for panel order COVID PRE-OP / PRE-PROCEDURE SCREENING ORDER (NO ISOLATION) - Swab, Nasopharynx.  Procedure                               Abnormality         Status                     ---------                               -----------         ------                     COVID-19,CEPHEID/SLOAN/BD...[[id number]]  Normal              Final result                 Please view results for these tests on the individual orders.   RAINBOW DRAW    Narrative:     The following orders were created for panel order [place] Draw.  Procedure                               Abnormality         Status                     ---------                               -----------         ------                     Green Top (Gel)[[id number]]                                  Final result               Lavender Top[[id number]]                                     Final result               Gold Top - SST[[id number]]                                   Final result               Light Blue " Top[[id number]]                                   Final result                 Please view results for these tests on the individual orders.   GREEN TOP   LAVENDER TOP   GOLD TOP - SST   LIGHT BLUE TOP   CBC AND DIFFERENTIAL    Narrative:     The following orders were created for panel order CBC & Differential.  Procedure                               Abnormality         Status                     ---------                               -----------         ------                     CBC Auto Differential[[id number]]        Abnormal            Final result                 Please view results for these tests on the individual orders.     XR Chest AP    Result Date: 10/13/2021  1.Mild left basilar opacities which could indicate atelectasis or pneumonia. 2.Prominent interstitial markings which could indicate chronic lung disease or mild edema.  Electronically Signed By-Walter Miller MD On:10/13/2021 7:28 AM This report was finalized on [id number] by  Walter Miller MD.    Medications   sodium chloride 0.9 % flush 10 mL (has no administration in time range)   furosemide (LASIX) injection 40 mg (has no administration in time range)                                          MDM  Number of Diagnoses or Management Options  Pneumonia of left lower lobe due to infectious organism  Shortness of breath  Diagnosis management comments: Comorbidities: Hypertension, diabetes  Differentials: CHF, pneumonia, Covid, PE;this list is not all inclusive and does not constitute the entirety of considered causes  Discussion with provider:  Radiology interpretation: X-rays reviewed by me and interpreted by radiologist: As above  Lab interpretation: Labs viewed by me significant for: As above    Patient is placed on continuous cardiac monitor.  She had IV established.  She had labs, EKG chest x-ray obtained.    Work-up: EKG reviewed by me and interpreted by Dr. Kapoor shows sinus rhythm with ventricular rate of 76.  No acute ST or T wave  changes.  CBC is grossly unremarkable. Metabolic panel is also grossly unremarkable. Negative troponin. proBNP is within normal limits. D-dimer also within normal limits. Covid negative. Chest x-ray shows mild left basilar opacities that could indicate atelectasis or pneumonia. Prominent interstitial markings which could indicate a chronic lung disease or mild edema.    On reexamination the patient is resting comfortably. She is in no distress. She has no new complaints. She has stable vital signs. She is not hypoxic. She is in no respiratory distress. Work-up today appears to be consistent with mild pneumonia. She reports shortness of breath with cough. She does report some increased leg edema but states that she has not been taking her diuretic. proBNP is within normal limits. She has no chest pain.    We discussed admission versus discharge home. Patient will be discharged home with oral antibiotics. She was given a dose of IV Lasix here prior to discharge. She will be instructed to resume her home diuretic and to follow-up closely with her primary care provider. She was given warning signs for prompt return. She voiced understanding.    Diagnosis and treatment plan discussed with patient.  Patient agreeable to plan.   I discussed findings with patient who voices understanding of discharge instructions, signs and symptoms requiring return to ED; discharged improved and in stable condition with follow up for re-evaluation.  Prescriptions for Augmentin and doxycycline.         Amount and/or Complexity of Data Reviewed  Clinical lab tests: ordered and reviewed  Tests in the radiology section of CPT®: ordered and reviewed    Patient Progress  Patient progress: stable      Final diagnoses:   Shortness of breath   Pneumonia of left lower lobe due to infectious organism       ED Disposition  ED Disposition     ED Disposition Condition Comment    Discharge Stable           Rachel Randall MD  [address]  [facility]  [address]  [place] [address]  [phone]    Schedule an appointment as soon as possible for a visit            Medication List      New Prescriptions    amoxicillin-clavulanate 875-125 MG per tablet  Commonly known as: AUGMENTIN  Take 1 tablet by mouth 2 (Two) Times a Day for 7 days.     doxycycline 100 MG tablet  Commonly known as: VIBRAMYICN  Take 1 tablet by mouth 2 (Two) Times a Day for 7 days.           Where to Get Your Medications      These medications were sent to [facility] [address] [address] [address]  - [phone] [facility] [phone] [facility] [place] [address]    Phone: [phone]   · amoxicillin-clavulanate 875-125 MG per tablet  · doxycycline 100 MG tablet          Smiley Clark, BANDAR  10/13/21 5985

## 2021-10-13 NOTE — ED NOTES
Talked to Chante in lab, she is adding-on the procalcitonin, she stated she was able to.      Barbara James  10/13/21 0881

## 2021-10-13 NOTE — DISCHARGE INSTRUCTIONS
"Take antibiotics as prescribed. Resume your regularly prescribed medications including your \"water pill.\" Use Mucinex for congestion. Close follow-up with your primary care provider. Call him today. Return for new or worsening symptoms.  "

## 2021-10-13 NOTE — ED NOTES
Pt A&Ox 4 with lungs CTA bases. Pt ambulatory at this time.   Discharge instruction given to pt with verbal understanding received.   Pt discharged with education, RX, and personal belongings.        Nancy Woodward RN  10/13/21 1490

## 2022-05-06 ENCOUNTER — TRANSCRIBE ORDERS (OUTPATIENT)
Dept: ADMINISTRATIVE | Facility: HOSPITAL | Age: 72
End: 2022-05-06

## 2022-05-06 ENCOUNTER — LAB (OUTPATIENT)
Dept: LAB | Facility: HOSPITAL | Age: 72
End: 2022-05-06

## 2022-05-06 DIAGNOSIS — J81.0 POSTOPERATIVE PULMONARY EDEMA: ICD-10-CM

## 2022-05-06 DIAGNOSIS — J30.1 ALLERGIC RHINITIS DUE TO POLLEN, UNSPECIFIED SEASONALITY: Primary | ICD-10-CM

## 2022-05-06 DIAGNOSIS — J18.9 UNRESOLVED PNEUMONIA: ICD-10-CM

## 2022-05-06 DIAGNOSIS — E78.2 MIXED HYPERLIPIDEMIA: ICD-10-CM

## 2022-05-06 DIAGNOSIS — E11.9 DIABETES MELLITUS WITHOUT COMPLICATION: ICD-10-CM

## 2022-05-06 DIAGNOSIS — R20.1 HYPOESTHESIA: ICD-10-CM

## 2022-05-06 DIAGNOSIS — J04.0 STREPTOCOCCAL LARYNGITIS: ICD-10-CM

## 2022-05-06 DIAGNOSIS — E11.40 DIABETIC NEUROPATHY WITH NEUROLOGIC COMPLICATION: ICD-10-CM

## 2022-05-06 DIAGNOSIS — I10 ESSENTIAL HYPERTENSION, MALIGNANT: ICD-10-CM

## 2022-05-06 DIAGNOSIS — B95.5 STREPTOCOCCAL LARYNGITIS: ICD-10-CM

## 2022-05-06 DIAGNOSIS — E11.49 DIABETIC NEUROPATHY WITH NEUROLOGIC COMPLICATION: ICD-10-CM

## 2022-05-06 DIAGNOSIS — J30.1 ALLERGIC RHINITIS DUE TO POLLEN, UNSPECIFIED SEASONALITY: ICD-10-CM

## 2022-05-06 LAB
ALBUMIN SERPL-MCNC: 4 G/DL (ref 3.5–5.2)
ALBUMIN/GLOB SERPL: 1.4 G/DL
ALP SERPL-CCNC: 137 U/L (ref 39–117)
ALT SERPL W P-5'-P-CCNC: 31 U/L (ref 1–33)
ANION GAP SERPL CALCULATED.3IONS-SCNC: 11.4 MMOL/L (ref 5–15)
AST SERPL-CCNC: 25 U/L (ref 1–32)
BASOPHILS # BLD AUTO: 0.04 10*3/MM3 (ref 0–0.2)
BASOPHILS NFR BLD AUTO: 0.5 % (ref 0–1.5)
BILIRUB SERPL-MCNC: 0.5 MG/DL (ref 0–1.2)
BUN SERPL-MCNC: 16 MG/DL (ref 8–23)
BUN/CREAT SERPL: 17 (ref 7–25)
CALCIUM SPEC-SCNC: 9.6 MG/DL (ref 8.6–10.5)
CHLORIDE SERPL-SCNC: 101 MMOL/L (ref 98–107)
CO2 SERPL-SCNC: 23.6 MMOL/L (ref 22–29)
CREAT SERPL-MCNC: 0.94 MG/DL (ref 0.57–1)
DEPRECATED RDW RBC AUTO: 43.2 FL (ref 37–54)
EGFRCR SERPLBLD CKD-EPI 2021: 65.4 ML/MIN/1.73
EOSINOPHIL # BLD AUTO: 0.24 10*3/MM3 (ref 0–0.4)
EOSINOPHIL NFR BLD AUTO: 3.1 % (ref 0.3–6.2)
ERYTHROCYTE [DISTWIDTH] IN BLOOD BY AUTOMATED COUNT: 13.8 % (ref 12.3–15.4)
GLOBULIN UR ELPH-MCNC: 2.8 GM/DL
GLUCOSE SERPL-MCNC: 201 MG/DL (ref 65–99)
HCT VFR BLD AUTO: 37.8 % (ref 34–46.6)
HGB BLD-MCNC: 12.2 G/DL (ref 12–15.9)
IMM GRANULOCYTES # BLD AUTO: 0.04 10*3/MM3 (ref 0–0.05)
IMM GRANULOCYTES NFR BLD AUTO: 0.5 % (ref 0–0.5)
LYMPHOCYTES # BLD AUTO: 1.13 10*3/MM3 (ref 0.7–3.1)
LYMPHOCYTES NFR BLD AUTO: 14.5 % (ref 19.6–45.3)
MCH RBC QN AUTO: 27.9 PG (ref 26.6–33)
MCHC RBC AUTO-ENTMCNC: 32.3 G/DL (ref 31.5–35.7)
MCV RBC AUTO: 86.5 FL (ref 79–97)
MONOCYTES # BLD AUTO: 0.41 10*3/MM3 (ref 0.1–0.9)
MONOCYTES NFR BLD AUTO: 5.3 % (ref 5–12)
NEUTROPHILS NFR BLD AUTO: 5.94 10*3/MM3 (ref 1.7–7)
NEUTROPHILS NFR BLD AUTO: 76.1 % (ref 42.7–76)
NRBC BLD AUTO-RTO: 0 /100 WBC (ref 0–0.2)
NT-PROBNP SERPL-MCNC: 185 PG/ML (ref 0–900)
PLATELET # BLD AUTO: 167 10*3/MM3 (ref 140–450)
PMV BLD AUTO: 11.1 FL (ref 6–12)
POTASSIUM SERPL-SCNC: 4.1 MMOL/L (ref 3.5–5.2)
PROT SERPL-MCNC: 6.8 G/DL (ref 6–8.5)
RBC # BLD AUTO: 4.37 10*6/MM3 (ref 3.77–5.28)
SODIUM SERPL-SCNC: 136 MMOL/L (ref 136–145)
WBC NRBC COR # BLD: 7.8 10*3/MM3 (ref 3.4–10.8)

## 2022-05-06 PROCEDURE — 85025 COMPLETE CBC W/AUTO DIFF WBC: CPT

## 2022-05-06 PROCEDURE — 80053 COMPREHEN METABOLIC PANEL: CPT

## 2022-05-06 PROCEDURE — 83880 ASSAY OF NATRIURETIC PEPTIDE: CPT

## 2022-05-06 PROCEDURE — 36415 COLL VENOUS BLD VENIPUNCTURE: CPT

## 2022-11-05 NOTE — PROGRESS NOTES
LOS: 0 days   Patient Care Team:  Ole Miranda MD as PCP - General (Internal Medicine)    Chief Complaint:  Vulvar abscess    Subjective     Interval History:     Patient Complaints: none today the patient is feeling much better, a little itching, otherwise feels well  History taken from: patient and chart    Review of Systems:    All systems were reviewed and negative except for:  Genitourinary: postivie for  pain less, some itching at wound site  vulvar abscess    Objective     Vital Signs  Vitals:    06/13/20 0924 06/13/20 1152 06/13/20 1945 06/14/20 0344   BP: 153/83 133/71 135/74 173/74   BP Location: Right arm Left arm Left arm Left arm   Patient Position: Sitting Sitting Lying Lying   Pulse: 82 78 79 77   Resp:  16 16 16   Temp:  97.6 °F (36.4 °C) 97.8 °F (36.6 °C) 97.6 °F (36.4 °C)   TempSrc:  Oral Oral Oral   SpO2: 98% 95% 95% 97%   Weight:    94 kg (207 lb 3.2 oz)   Height:           Physical Exam:     General Appearance:    Alert, cooperative, in no acute distress   Lungs:     Clear to auscultation,respirations regular, even and                   unlabored    Heart:    Regular rhythm and normal rate.   Breast Exam:    Deferred   Abdomen:     Normal bowel sounds, no masses, no organomegaly, soft        non-tender, non-distended, no guarding, no rebound                 tenderness   Genitalia:    Wound improved, less erythema, central portion forming scab, looks slightly smaller than yesterday, pain is less   Extremities:   Moves all extremities well, no edema, no cyanosis, no             redness        Labs:  Lab Results (last 48 hours)     Procedure Component Value Units Date/Time    POC Glucose Once [[id number]]  (Normal) Collected:  06/14/20 0711    Specimen:  Blood Updated:  06/14/20 0712     Glucose 99 mg/dL      Comment: Serial Number: [id number]:  [id number]       Basic Metabolic Panel [[id number]]  (Abnormal) Collected:  06/14/20 0457    Specimen:  Blood Updated:  06/14/20 0699      Glucose 91 mg/dL      BUN --     Comment: Testing performed by alternate method        Creatinine 1.01 mg/dL      Sodium 138 mmol/L      Potassium 3.8 mmol/L      Chloride 103 mmol/L      CO2 25.0 mmol/L      Calcium 9.5 mg/dL      eGFR Non African Amer 55 mL/min/1.73      BUN/Creatinine Ratio --     Comment: Testing not performed.        Anion Gap 10.0 mmol/L     Narrative:       GFR Normal >60  Chronic Kidney Disease <60  Kidney Failure <15      Magnesium [[id number]]  (Normal) Collected:  06/14/20 0457    Specimen:  Blood Updated:  06/14/20 0537     Magnesium 1.9 mg/dL     BUN [[id number]] Collected:  06/14/20 0457    Specimen:  Blood Updated:  06/14/20 0537    CBC & Differential [[id number]] Collected:  06/14/20 0457    Specimen:  Blood Updated:  06/14/20 0516    Narrative:       The following orders were created for panel order CBC & Differential.  Procedure                               Abnormality         Status                     ---------                               -----------         ------                     CBC Auto Differential[[id number]]        Normal              Final result                 Please view results for these tests on the individual orders.    CBC Auto Differential [[id number]]  (Normal) Collected:  06/14/20 0457    Specimen:  Blood Updated:  06/14/20 0516     WBC 9.50 10*3/mm3      RBC 4.19 10*6/mm3      Hemoglobin 12.7 g/dL      Hematocrit 37.6 %      MCV 89.8 fL      MCH 30.4 pg      MCHC 33.8 g/dL      RDW 15.0 %      RDW-SD 46.8 fl      MPV 8.6 fL      Platelets 249 10*3/mm3      Neutrophil % 57.8 %      Lymphocyte % 32.1 %      Monocyte % 6.5 %      Eosinophil % 3.5 %      Basophil % 0.1 %      Neutrophils, Absolute 5.50 10*3/mm3      Lymphocytes, Absolute 3.00 10*3/mm3      Monocytes, Absolute 0.60 10*3/mm3      Eosinophils, Absolute 0.30 10*3/mm3      Basophils, Absolute 0.00 10*3/mm3      nRBC 0.1 /100 WBC     POC Glucose Once [[id number]]  (Abnormal) Collected:  06/13/20 1946     Specimen:  Blood Updated:  06/14/20 0309     Glucose 139 mg/dL      Comment: Serial Number: [id number]:  [id number]       Blood Culture - Blood, Arm, Left [[id number]] Collected:  06/10/20 0035    Specimen:  Blood from Arm, Left Updated:  06/14/20 0045     Blood Culture No growth at 4 days    Blood Culture - Blood, Arm, Right [[id number]] Collected:  06/10/20 0035    Specimen:  Blood from Arm, Right Updated:  06/14/20 0045     Blood Culture No growth at 4 days    POC Glucose Once [[id number]]  (Normal) Collected:  06/13/20 1635    Specimen:  Blood Updated:  06/13/20 1637     Glucose 99 mg/dL      Comment: Serial Number: [id number]:  [id number]       POC Glucose Once [[id number]]  (Abnormal) Collected:  06/12/20 2028    Specimen:  Blood Updated:  06/13/20 1622     Glucose 109 mg/dL      Comment: Serial Number: [id number]:  [id number]       POC Glucose Once [[id number]]  (Abnormal) Collected:  06/13/20 1151    Specimen:  Blood Updated:  06/13/20 1157     Glucose 142 mg/dL      Comment: Serial Number: [id number]:  [id number]       BUN [[id number]]  (Normal) Collected:  06/13/20 0216    Specimen:  Blood Updated:  06/13/20 0822     BUN 18 mg/dL     POC Glucose Once [[id number]]  (Normal) Collected:  06/13/20 0719    Specimen:  Blood Updated:  06/13/20 0720     Glucose 104 mg/dL      Comment: Serial Number: [id number]:  [id number]       Vancomycin, Trough [[id number]]  (Normal) Collected:  06/13/20 0216    Specimen:  Blood Updated:  06/13/20 0247     Vancomycin Trough 8.00 mcg/mL     Basic Metabolic Panel [[id number]]  (Abnormal) Collected:  06/13/20 0216    Specimen:  Blood Updated:  06/13/20 0247     Glucose 70 mg/dL      BUN --     Comment: Testing performed by alternate method        Creatinine 1.01 mg/dL      Sodium 140 mmol/L      Potassium 3.3 mmol/L      Chloride 105 mmol/L      CO2 25.0 mmol/L      Calcium 9.3 mg/dL      eGFR Non African Amer 55 mL/min/1.73      BUN/Creatinine Ratio --      Comment: Testing not performed.        Anion Gap 10.0 mmol/L     Narrative:       GFR Normal >60  Chronic Kidney Disease <60  Kidney Failure <15      CBC & Differential [[id number]] Collected:  06/13/20 0215    Specimen:  Blood Updated:  06/13/20 0233    Narrative:       The following orders were created for panel order CBC & Differential.  Procedure                               Abnormality         Status                     ---------                               -----------         ------                     CBC Auto Differential[[id number]]        Abnormal            Final result                 Please view results for these tests on the individual orders.    CBC Auto Differential [[id number]]  (Abnormal) Collected:  06/13/20 0215    Specimen:  Blood Updated:  06/13/20 0233     WBC 7.90 10*3/mm3      RBC 3.85 10*6/mm3      Hemoglobin 11.6 g/dL      Hematocrit 34.4 %      MCV 89.4 fL      MCH 30.3 pg      MCHC 33.9 g/dL      RDW 15.3 %      RDW-SD 47.7 fl      MPV 8.4 fL      Platelets 234 10*3/mm3      Neutrophil % 54.0 %      Lymphocyte % 33.0 %      Monocyte % 6.5 %      Eosinophil % 5.2 %      Basophil % 1.3 %      Neutrophils, Absolute 4.30 10*3/mm3      Lymphocytes, Absolute 2.60 10*3/mm3      Monocytes, Absolute 0.50 10*3/mm3      Eosinophils, Absolute 0.40 10*3/mm3      Basophils, Absolute 0.10 10*3/mm3      nRBC 0.1 /100 WBC     POC Glucose Once [[id number]]  (Normal) Collected:  06/12/20 1659    Specimen:  Blood Updated:  06/12/20 1702     Glucose 93 mg/dL      Comment: Serial Number: [id number]:  [id number]       POC Glucose Once [[id number]]  (Normal) Collected:  06/12/20 1121    Specimen:  Blood Updated:  06/12/20 1130     Glucose 99 mg/dL      Comment: Serial Number: [id number]:  [id number]             Radiology:  Imaging Results (Last 48 Hours)     ** No results found for the last 48 hours. **            Assessment/Plan       Abscess of groin, left      Pt can be discharged with clindamycin  PO x 10days, will see pt in my office in 1 week for followup.  Again reiterated tight glucose control to help her heal. Thanks for the consult on this nice lady.    Antonia Szymanski MD  06/14/20  09:50       all other ROS negative except as per HPI

## 2023-03-12 ENCOUNTER — APPOINTMENT (OUTPATIENT)
Dept: CT IMAGING | Facility: HOSPITAL | Age: 73
End: 2023-03-12
Payer: MEDICARE

## 2023-03-12 ENCOUNTER — HOSPITAL ENCOUNTER (EMERGENCY)
Facility: HOSPITAL | Age: 73
Discharge: HOME | End: 2023-03-13
Attending: EMERGENCY MEDICINE | Admitting: EMERGENCY MEDICINE
Payer: MEDICARE

## 2023-03-12 DIAGNOSIS — R51.9 ACUTE NONINTRACTABLE HEADACHE, UNSPECIFIED HEADACHE TYPE: ICD-10-CM

## 2023-03-12 DIAGNOSIS — I10 HYPERTENSION, UNSPECIFIED TYPE: Primary | ICD-10-CM

## 2023-03-12 LAB
BASOPHILS # BLD AUTO: 0 10*3/MM3 (ref 0–0.2)
BASOPHILS NFR BLD AUTO: 0.6 % (ref 0–1.5)
DEPRECATED RDW RBC AUTO: 45.1 FL (ref 37–54)
EOSINOPHIL # BLD AUTO: 0.2 10*3/MM3 (ref 0–0.4)
EOSINOPHIL NFR BLD AUTO: 2.7 % (ref 0.3–6.2)
ERYTHROCYTE [DISTWIDTH] IN BLOOD BY AUTOMATED COUNT: 14.9 % (ref 12.3–15.4)
HCT VFR BLD AUTO: 39 % (ref 34–46.6)
HGB BLD-MCNC: 12.6 G/DL (ref 12–15.9)
LYMPHOCYTES # BLD AUTO: 2.1 10*3/MM3 (ref 0.7–3.1)
LYMPHOCYTES NFR BLD AUTO: 31 % (ref 19.6–45.3)
MCH RBC QN AUTO: 26.7 PG (ref 26.6–33)
MCHC RBC AUTO-ENTMCNC: 32.3 G/DL (ref 31.5–35.7)
MCV RBC AUTO: 82.6 FL (ref 79–97)
MONOCYTES # BLD AUTO: 0.5 10*3/MM3 (ref 0.1–0.9)
MONOCYTES NFR BLD AUTO: 6.9 % (ref 5–12)
NEUTROPHILS NFR BLD AUTO: 3.9 10*3/MM3 (ref 1.7–7)
NEUTROPHILS NFR BLD AUTO: 58.8 % (ref 42.7–76)
NRBC BLD AUTO-RTO: 0.1 /100 WBC (ref 0–0.2)
PLATELET # BLD AUTO: 171 10*3/MM3 (ref 140–450)
PMV BLD AUTO: 9.2 FL (ref 6–12)
RBC # BLD AUTO: 4.72 10*6/MM3 (ref 3.77–5.28)
WBC NRBC COR # BLD: 6.6 10*3/MM3 (ref 3.4–10.8)

## 2023-03-12 PROCEDURE — 80053 COMPREHEN METABOLIC PANEL: CPT | Performed by: NURSE PRACTITIONER

## 2023-03-12 PROCEDURE — 99283 EMERGENCY DEPT VISIT LOW MDM: CPT

## 2023-03-12 PROCEDURE — 70450 CT HEAD/BRAIN W/O DYE: CPT

## 2023-03-12 PROCEDURE — 83880 ASSAY OF NATRIURETIC PEPTIDE: CPT | Performed by: NURSE PRACTITIONER

## 2023-03-12 PROCEDURE — 99284 EMERGENCY DEPT VISIT MOD MDM: CPT

## 2023-03-12 PROCEDURE — 96374 THER/PROPH/DIAG INJ IV PUSH: CPT

## 2023-03-12 PROCEDURE — 96376 TX/PRO/DX INJ SAME DRUG ADON: CPT

## 2023-03-12 PROCEDURE — 84484 ASSAY OF TROPONIN QUANT: CPT | Performed by: NURSE PRACTITIONER

## 2023-03-12 PROCEDURE — 85025 COMPLETE CBC W/AUTO DIFF WBC: CPT | Performed by: NURSE PRACTITIONER

## 2023-03-12 RX ORDER — LABETALOL HYDROCHLORIDE 5 MG/ML
10 INJECTION, SOLUTION INTRAVENOUS ONCE
Status: COMPLETED | OUTPATIENT
Start: 2023-03-12 | End: 2023-03-12

## 2023-03-12 RX ORDER — SODIUM CHLORIDE 0.9 % (FLUSH) 0.9 %
10 SYRINGE (ML) INJECTION AS NEEDED
Status: DISCONTINUED | OUTPATIENT
Start: 2023-03-12 | End: 2023-03-13 | Stop reason: HOSPADM

## 2023-03-12 RX ORDER — CLONIDINE HYDROCHLORIDE 0.1 MG/1
0.1 TABLET ORAL AS NEEDED
Qty: 6 TABLET | Refills: 0 | Status: SHIPPED | OUTPATIENT
Start: 2023-03-12

## 2023-03-12 RX ADMIN — LABETALOL HYDROCHLORIDE 10 MG: 5 INJECTION, SOLUTION INTRAVENOUS at 23:37

## 2023-03-12 RX ADMIN — LABETALOL HYDROCHLORIDE 10 MG: 5 INJECTION, SOLUTION INTRAVENOUS at 22:51

## 2023-03-13 VITALS
TEMPERATURE: 97.5 F | HEART RATE: 77 BPM | DIASTOLIC BLOOD PRESSURE: 78 MMHG | WEIGHT: 205.91 LBS | RESPIRATION RATE: 18 BRPM | BODY MASS INDEX: 34.31 KG/M2 | OXYGEN SATURATION: 94 % | HEIGHT: 65 IN | SYSTOLIC BLOOD PRESSURE: 181 MMHG

## 2023-03-13 LAB
ALBUMIN SERPL-MCNC: 4.1 G/DL (ref 3.5–5.2)
ALBUMIN/GLOB SERPL: 1.5 G/DL
ALP SERPL-CCNC: 181 U/L (ref 39–117)
ALT SERPL W P-5'-P-CCNC: 40 U/L (ref 1–33)
ANION GAP SERPL CALCULATED.3IONS-SCNC: 13 MMOL/L (ref 5–15)
AST SERPL-CCNC: 32 U/L (ref 1–32)
BILIRUB SERPL-MCNC: 0.4 MG/DL (ref 0–1.2)
BUN SERPL-MCNC: 20 MG/DL (ref 8–23)
BUN/CREAT SERPL: 22.7 (ref 7–25)
CALCIUM SPEC-SCNC: 9.4 MG/DL (ref 8.6–10.5)
CHLORIDE SERPL-SCNC: 97 MMOL/L (ref 98–107)
CO2 SERPL-SCNC: 26 MMOL/L (ref 22–29)
CREAT SERPL-MCNC: 0.88 MG/DL (ref 0.57–1)
EGFRCR SERPLBLD CKD-EPI 2021: 70.4 ML/MIN/1.73
GLOBULIN UR ELPH-MCNC: 2.8 GM/DL
GLUCOSE SERPL-MCNC: 255 MG/DL (ref 65–99)
NT-PROBNP SERPL-MCNC: 436.9 PG/ML (ref 0–900)
POTASSIUM SERPL-SCNC: 3.7 MMOL/L (ref 3.5–5.2)
PROT SERPL-MCNC: 6.9 G/DL (ref 6–8.5)
SODIUM SERPL-SCNC: 136 MMOL/L (ref 136–145)
TROPONIN T SERPL HS-MCNC: 20 NG/L
TROPONIN T SERPL HS-MCNC: 24 NG/L

## 2023-03-13 PROCEDURE — 84484 ASSAY OF TROPONIN QUANT: CPT | Performed by: NURSE PRACTITIONER

## 2023-03-13 RX ORDER — CLONIDINE HYDROCHLORIDE 0.1 MG/1
0.1 TABLET ORAL ONCE
Status: COMPLETED | OUTPATIENT
Start: 2023-03-13 | End: 2023-03-13

## 2023-03-13 RX ADMIN — CLONIDINE HYDROCHLORIDE 0.1 MG: 0.1 TABLET ORAL at 00:12

## 2023-03-13 NOTE — ED NOTES
Returns from CT, family at bedside. Remains hypertensive. 2nd dose of labetalol ordered and given by Karissa.

## 2023-03-13 NOTE — DISCHARGE INSTRUCTIONS
Continue your home blood pressure medication and use the clonidine only when your systolic is greater than 190-and only use it once a day    See Dr. Randall tomorrow for follow-up    Return if worse

## 2023-03-13 NOTE — ED NOTES
Pt. States she has had hypertension since yesterday and is currently taking metoprolol for HTN. Called PMD who told her to monitor pressures throughout the day (which remained high) and came in tonight when her BP was 214 at home systolic. Diastolic pressures have remained WNL per pt. Home BP log. C/o mild headache. Used to see Dr. Herring for well visits but denies any cardiac interventions. Family at bedside. SR on monitor.

## 2023-03-13 NOTE — ED PROVIDER NOTES
Subjective   History of Present Illness  Patient is a 72-year-old white female who presents to the ER, with daughter at bedside, who is concerned about elevated blood pressure.  Patient admits that she has a past medical history of hypertension, for which she takes metoprolol 50 mg 3 times daily.  On further questioning, she denies being compliant with this regimen.  She denies checking her blood pressure routinely, but states she did check her blood pressure this morning which revealed a pressure of approximately 190/90.  After seeing this pressure she claims she was a bit surprised, and then proceeded to check her blood pressure throughout the day which revealed slightly acute elevation of approximately 200/90.  Patient admits to a headache which she states is intermittent, describes the quality as sharp and localized to bilateral temples, and states the severity to be a 2 out of 10 currently.  Patient denies any chest pain, shortness of air, nausea or vomiting, dizziness.        Review of Systems   Constitutional: Negative for chills, fatigue and fever.   HENT: Negative for congestion, tinnitus and trouble swallowing.    Eyes: Negative for photophobia, discharge and redness.   Respiratory: Negative for cough and shortness of breath.    Cardiovascular: Negative for chest pain and palpitations.        Hypertension   Gastrointestinal: Negative for abdominal pain, diarrhea, nausea and vomiting.   Genitourinary: Negative for dysuria, frequency and urgency.   Musculoskeletal: Negative for back pain, joint swelling and myalgias.   Skin: Negative for rash.   Neurological: Positive for headaches. Negative for dizziness.   Psychiatric/Behavioral: Negative for confusion.   All other systems reviewed and are negative.      Past Medical History:   Diagnosis Date   • Diabetes (HCC)    • Gout    • Hypertension        No Known Allergies    Past Surgical History:   Procedure Laterality Date   • CHOLECYSTECTOMY     • HYSTERECTOMY      • TONSILLECTOMY         Family History   Family history unknown: Yes       Social History     Socioeconomic History   • Marital status:    Tobacco Use   • Smoking status: Never   • Smokeless tobacco: Never   Vaping Use   • Vaping Use: Never used   Substance and Sexual Activity   • Alcohol use: Never   • Drug use: Never   • Sexual activity: Defer           Objective   Physical Exam  Vitals reviewed.   Constitutional:       General: She is not in acute distress.     Appearance: Normal appearance. She is well-developed. She is obese. She is not toxic-appearing.   HENT:      Head: Normocephalic and atraumatic.   Eyes:      Conjunctiva/sclera: Conjunctivae normal.      Pupils: Pupils are equal, round, and reactive to light.   Cardiovascular:      Rate and Rhythm: Normal rate and regular rhythm.      Heart sounds: Normal heart sounds.   Pulmonary:      Effort: Pulmonary effort is normal. No respiratory distress.      Breath sounds: Normal breath sounds. No wheezing.   Abdominal:      General: Bowel sounds are normal. There is no distension.      Palpations: Abdomen is soft. There is no mass.      Tenderness: There is no abdominal tenderness. There is no guarding or rebound.   Musculoskeletal:         General: No deformity. Normal range of motion.      Cervical back: Normal range of motion and neck supple.   Skin:     General: Skin is warm and dry.      Capillary Refill: Capillary refill takes less than 2 seconds.   Neurological:      General: No focal deficit present.      Mental Status: She is alert and oriented to person, place, and time.      GCS: GCS eye subscore is 4. GCS verbal subscore is 5. GCS motor subscore is 6.      Cranial Nerves: No cranial nerve deficit.      Sensory: No sensory deficit.      Deep Tendon Reflexes: Reflexes normal.   Psychiatric:         Mood and Affect: Mood normal.         Behavior: Behavior normal.         Procedures           ED Course  ED Course as of 03/21/23 0636   [facility]  "12, 2023 2323 Patient in CAT scan at this time [KW]      ED Course User Index  [KW] Le Choudhury, APRN      BP (!) 181/78   Pulse 77   Temp 97.5 °F (36.4 °C) (Temporal)   Resp 18   Ht 165.1 cm (65\")   Wt 93.4 kg (205 lb 14.6 oz)   SpO2 94%   BMI 34.27 kg/m²   Labs Reviewed   COMPREHENSIVE METABOLIC PANEL - Abnormal; Notable for the following components:       Result Value    Glucose 255 (*)     Chloride 97 (*)     ALT (SGPT) 40 (*)     Alkaline Phosphatase 181 (*)     All other components within normal limits    Narrative:     GFR Normal >60  Chronic Kidney Disease <60  Kidney Failure <15    The GFR formula is only valid for adults with stable renal function between ages 18 and 70.   SINGLE HSTROPONIN T - Abnormal; Notable for the following components:    HS Troponin T 24 (*)     All other components within normal limits    Narrative:     High Sensitive Troponin T Reference Range:  <10.0 ng/L- Negative Female for AMI  <15.0 ng/L- Negative Male for AMI  >=10 - Abnormal Female indicating possible myocardial injury.  >=15 - Abnormal Male indicating possible myocardial injury.   Clinicians would have to utilize clinical acumen, EKG, Troponin, and serial changes to determine if it is an Acute Myocardial Infarction or myocardial injury due to an underlying chronic condition.        SINGLE HSTROPONIN T - Abnormal; Notable for the following components:    HS Troponin T 20 (*)     All other components within normal limits    Narrative:     High Sensitive Troponin T Reference Range:  <10.0 ng/L- Negative Female for AMI  <15.0 ng/L- Negative Male for AMI  >=10 - Abnormal Female indicating possible myocardial injury.  >=15 - Abnormal Male indicating possible myocardial injury.   Clinicians would have to utilize clinical acumen, EKG, Troponin, and serial changes to determine if it is an Acute Myocardial Infarction or myocardial injury due to an underlying chronic condition.        BNP (IN-HOUSE) - Normal    " Narrative:     Among patients with dyspnea, NT-proBNP is highly sensitive for the detection of acute congestive heart failure. In addition NT-proBNP of <300 pg/ml effectively rules out acute congestive heart failure with 99% negative predictive value.     CBC WITH AUTO DIFFERENTIAL - Normal   CBC AND DIFFERENTIAL    Narrative:     The following orders were created for panel order CBC & Differential.  Procedure                               Abnormality         Status                     ---------                               -----------         ------                     CBC Auto Differential[[id number]]        Normal              Final result                 Please view results for these tests on the individual orders.     Medications   labetalol (NORMODYNE,TRANDATE) injection 10 mg (10 mg Intravenous Given 3/12/23 2251)   labetalol (NORMODYNE,TRANDATE) injection 10 mg (10 mg Intravenous Given 3/12/23 2337)   cloNIDine (CATAPRES) tablet 0.1 mg (0.1 mg Oral Given 3/13/23 0012)                                          Medical Decision Making  72-year-old female who is noncompliant with her blood pressure medication who presents today with acute severe hypertension she states she also has a mild headache she rates that a 2/10.  She states that she only took her blood pressure medication which is metoprolol 50 mg twice today-she became concerned when her systolic became greater than 200 which brought her to the emergency room.  She states that her primary care provider is Dr. Randall and she is working with him to control the blood pressure and he called in more blood pressure medication for her yesterday but she has been unable to  the prescription yet-  IV was established and blood work was obtained and the labs were interpreted by myself  Patient was treated with IV labetalol which reduced her blood pressure down to about 194/72 CT of the head was obtained and interpreted by myself    Acute nonintractable  headache, unspecified headache type: complicated acute illness or injury  Hypertension, unspecified type: complicated acute illness or injury  Amount and/or Complexity of Data Reviewed  Labs: ordered.  Radiology: ordered.      Risk  Prescription drug management.          Final diagnoses:   Hypertension, unspecified type   Acute nonintractable headache, unspecified headache type       ED Disposition  ED Disposition     ED Disposition   Discharge    Condition   Stable    Comment   --             Rachel Randall MD  [address] [facility]  [address]  [place] [address] [phone]      See tomorrow for further management of your blood pressure         Medication List      New Prescriptions    cloNIDine 0.1 MG tablet  Commonly known as: CATAPRES  Take 1 tablet by mouth As Needed for High Blood Pressure. As needed once daily for BP systolic greater than 190           Where to Get Your Medications      These medications were sent to [facility] [id number] - [place], [place] - [address] [place] [address] [address]  - [phone] PH - [phone] [address] [place] [address]    Phone: [phone]   · cloNIDine 0.1 MG tablet          Le Choudhury, APRN  03/21/23 0636

## 2023-03-13 NOTE — ED NOTES
A 4 year old child who has outgrown the forward facing, internal harness system shall be restrained in a belt positioning child booster seat.  If you have an active [facility] account, please look for your well child questionnaire to come to your [facility] account before your next well child visit.    Well-Child Checkup: 4 Years     Bicycle safety equipment, such as a helmet, helps keep your child safe.     Even if your child is healthy, keep taking him or her for yearly checkups. This helps to make sure that your childs health is protected with scheduled vaccines and health screenings. Your healthcare provider can make sure your childs growth and development is progressing well. This sheet describes some of what you can expect.  Development and milestones  The healthcare provider will ask questions and observe your childs behavior to get an idea of his or her development. By this visit, your child is likely doing some of the following:  · Enjoy and cooperate with other children  · Talk about what he or she likes (for example, toys, games, people)  · Tell a story, or singing a song  · Recognize most colors and shapes  · Say first and last name  · Use scissors  · Draw a person with 2 to 4 body parts  · Catch a ball that is bounced to him or her, most of the time  · Stand briefly on one foot  School and social issues  The healthcare provider will ask how your child is getting along with other kids. Talk about your childs experience in group settings such as . If your child isnt in , you could talk instead about behavior at  or during play dates. You may also want to discuss  choices and how to help prepare your child for . The healthcare provider may ask about:  · Behavior and participation in group settings. How does your child act at school (or other group setting)? Does he or she follow the routine and take part in group activities? What do teachers or caregivers  Report given to Karissa SILVA, Care transferred.   say about the childs behavior?  · Behavior at home. How does the child act at home? Is behavior at home better or worse than at school? (Be aware that its common for kids to be better behaved at school than at home.)  · Friendships. Has your child made friends with other children? What are the kids like? How does your child get along with these friends?  · Play. How does the child like to play? For example, does he or she play make believe? Does the child interact with others during playtime?  · [place]. How is your child adjusting to school? How does he or she react when you leave? (Some anxiety is normal. This should subside over time, as the child becomes more independent.)  Nutrition and exercise tips  Healthy eating and activity are 2 important keys to a healthy future. Its not too early to start teaching your child healthy habits that will last a lifetime. Here are some things you can do:  · Limit juice and sports drinks. These drinks--even pure fruit juice--have too much sugar. This leads to unhealthy weight gain and tooth decay. Water and low-fat or nonfat milk are best to drink. Limit juice to a small glass of 100% juice each day, such as during a meal.  · Dont serve soda. Its healthiest not to let your child have soda. If you do allow soda, save it for very special occasions.  · Offer nutritious foods. Keep a variety of healthy foods on hand for snacks, such as fresh fruits and vegetables, lean meats, and whole grains. Foods like French fries, candy, and snack foods should only be served rarely.  · Serve child-sized portions. Children dont need as much food as adults. Serve your child portions that make sense for his or her age. Let your child stop eating when he or she is full. If the child is still hungry after a meal, offer more vegetables or fruit. It's OK to put limits on how much your child eats.  · Encourage at least 30 to 60 minutes of active play per day. Moving around helps keep your  child healthy. Bring your child to the park, ride bikes, or play active games like tag or ball.  · Limit screen time to 1 hour each day. This includes TV watching, computer use, and video games.  · Ask the healthcare provider about your childs weight. At this age, your child should gain about 4 to 5 pounds each year. If he or she is gaining more than that, talk to the healthcare provider about healthy eating habits and activity guidelines.  · Take your child to the dentist at least twice a year for teeth cleaning and a checkup.  Safety tips  Recommendations to keep your child safe include the following:   · When riding a bike, your child should wear a helmet with the strap fastened. While roller-skating or using a scooter or skateboard, its safest to wear wrist guards, elbow pads, and knee pads, and a helmet.  · Keep using a car seat until your child outgrows it. (For many children, this happens around age 4 and a weight of at least 40 pounds.) Ask the healthcare provider if there are state laws regarding car seat use that you need to know about.  · Once your child outgrows the car seat, switch to a high-back booster seat. This allows the seat belt to fit properly. A booster seat should be used until your child is 4 feet 9 inches tall and between 8 and 12 years of age. All children younger than 13 years old should sit in the back seat.  · Teach your child not to talk to or go anywhere with a stranger.  · Start to teach your child his or her phone number, address, and parents first names. These are important to know in an emergency.  · Teach your child to swim. Many communities offer low-cost swimming lessons.  · If you have a swimming pool, it should be entirely fenced on all sides. [place] or doors leading to the pool should be closed and locked. Do not let your child play in or around the pool unattended, even if he or she knows how to swim.  Vaccines  Based on recommendations from the CDC, at this visit your  child may receive the following vaccines:  · Diphtheria, tetanus, and pertussis  · Influenza (flu), annually  · Measles, mumps, and rubella  · Polio  · Varicella (chickenpox)  Give your child positive reinforcement  Its easy to tell a child what theyre doing wrong. Its often harder to remember to praise a child for what they do right. Positive reinforcement (rewarding good behavior) helps your child develop confidence and a healthy self-esteem. Here are some tips:  · Give the child praise and attention for behaving well. When appropriate, make sure the whole family knows that the child has done well.  · Reward good behavior with hugs, kisses, and small gifts (such as stickers). When being good has rewards, kids will keep doing those behaviors to get the rewards. Avoid using sweets or candy as rewards. Using these treats as positive reinforcement can lead to unhealthy eating habits and an emotional attachment to food.  · When the child doesnt act the way you want, dont label the child as bad or naughty. Instead, describe why the action is not acceptable. (For example, say Its not nice to hit instead of Youre a bad girl.) When your child chooses the right behavior over the wrong one (such as walking away instead of hitting), remember to praise the good choice!  · Pledge to say 5 nice things to your child every day. Then do it!      Next checkup at: ______6 yrs________     PARENT NOTES:  Date Last Reviewed: 2016  © 5307-0981 [employer]. [address] [place] [address], [place] [address]. All rights reserved. This information is not intended as a substitute for professional medical care. Always follow your healthcare professional's instructions.

## 2023-03-24 ENCOUNTER — TELEPHONE (OUTPATIENT)
Dept: ENDOCRINOLOGY | Facility: CLINIC | Age: 73
End: 2023-03-24
Payer: MEDICARE

## 2023-03-24 NOTE — TELEPHONE ENCOUNTER
Tried to contact patient after receiving referral from PCP to be seen for diabetes by one of our endocrinology. No answer left vm asking to call our office so we can schedule the appt.

## 2023-06-14 ENCOUNTER — ON CAMPUS - OUTPATIENT (AMBULATORY)
Dept: URBAN - METROPOLITAN AREA HOSPITAL 2 | Facility: HOSPITAL | Age: 73
End: 2023-06-14
Payer: OTHER GOVERNMENT

## 2023-06-14 VITALS
OXYGEN SATURATION: 97 % | DIASTOLIC BLOOD PRESSURE: 60 MMHG | DIASTOLIC BLOOD PRESSURE: 79 MMHG | SYSTOLIC BLOOD PRESSURE: 134 MMHG | OXYGEN SATURATION: 98 % | RESPIRATION RATE: 16 BRPM | SYSTOLIC BLOOD PRESSURE: 135 MMHG | SYSTOLIC BLOOD PRESSURE: 171 MMHG | DIASTOLIC BLOOD PRESSURE: 78 MMHG | SYSTOLIC BLOOD PRESSURE: 116 MMHG | DIASTOLIC BLOOD PRESSURE: 76 MMHG | WEIGHT: 187 LBS | HEART RATE: 91 BPM | DIASTOLIC BLOOD PRESSURE: 75 MMHG | DIASTOLIC BLOOD PRESSURE: 63 MMHG | OXYGEN SATURATION: 100 % | HEART RATE: 88 BPM | SYSTOLIC BLOOD PRESSURE: 133 MMHG | SYSTOLIC BLOOD PRESSURE: 136 MMHG | SYSTOLIC BLOOD PRESSURE: 145 MMHG | HEART RATE: 89 BPM | RESPIRATION RATE: 18 BRPM | TEMPERATURE: 97.3 F | HEART RATE: 90 BPM | RESPIRATION RATE: 14 BRPM | SYSTOLIC BLOOD PRESSURE: 113 MMHG | HEIGHT: 65 IN | HEART RATE: 87 BPM | DIASTOLIC BLOOD PRESSURE: 69 MMHG | DIASTOLIC BLOOD PRESSURE: 66 MMHG | HEART RATE: 79 BPM | OXYGEN SATURATION: 99 % | SYSTOLIC BLOOD PRESSURE: 119 MMHG

## 2023-06-14 DIAGNOSIS — K57.30 DIVERTICULOSIS OF LARGE INTESTINE WITHOUT PERFORATION OR ABS: ICD-10-CM

## 2023-06-14 DIAGNOSIS — Z86.010 PERSONAL HISTORY OF COLONIC POLYPS: ICD-10-CM

## 2023-06-14 PROCEDURE — 45378 DIAGNOSTIC COLONOSCOPY: CPT | Mod: PT | Performed by: INTERNAL MEDICINE

## 2023-10-30 ENCOUNTER — TELEPHONE (OUTPATIENT)
Dept: PODIATRY | Facility: CLINIC | Age: 73
End: 2023-10-30
Payer: MEDICARE

## 2023-10-30 NOTE — TELEPHONE ENCOUNTER
Caller: Leslee Casper    Relationship to patient: Self    Best call back number: [phone]    Patient is needing: PATIENT RETUNING CALL TO MOVE APPT UP DUE TO CANCELLATION. UNABLE TO WT

## 2023-11-14 ENCOUNTER — OFFICE VISIT (OUTPATIENT)
Dept: PODIATRY | Facility: CLINIC | Age: 73
End: 2023-11-14
Payer: MEDICARE

## 2023-11-14 VITALS — WEIGHT: 177.1 LBS | HEIGHT: 65 IN | RESPIRATION RATE: 18 BRPM | BODY MASS INDEX: 29.51 KG/M2

## 2023-11-14 DIAGNOSIS — E11.65 TYPE 2 DIABETES MELLITUS WITH HYPERGLYCEMIA, WITH LONG-TERM CURRENT USE OF INSULIN: ICD-10-CM

## 2023-11-14 DIAGNOSIS — M77.42 METATARSALGIA OF BOTH FEET: ICD-10-CM

## 2023-11-14 DIAGNOSIS — M77.41 METATARSALGIA OF BOTH FEET: ICD-10-CM

## 2023-11-14 DIAGNOSIS — M21.862 ACQUIRED POSTERIOR EQUINUS OF BOTH LOWER EXTREMITIES: ICD-10-CM

## 2023-11-14 DIAGNOSIS — Z79.4 TYPE 2 DIABETES MELLITUS WITH HYPERGLYCEMIA, WITH LONG-TERM CURRENT USE OF INSULIN: ICD-10-CM

## 2023-11-14 DIAGNOSIS — M79.671 BILATERAL FOOT PAIN: Primary | ICD-10-CM

## 2023-11-14 DIAGNOSIS — M19.071 ARTHRITIS OF MIDTARSAL JOINTS OF BOTH FEET: ICD-10-CM

## 2023-11-14 DIAGNOSIS — M21.861 ACQUIRED POSTERIOR EQUINUS OF BOTH LOWER EXTREMITIES: ICD-10-CM

## 2023-11-14 DIAGNOSIS — M19.072 ARTHRITIS OF MIDTARSAL JOINTS OF BOTH FEET: ICD-10-CM

## 2023-11-14 DIAGNOSIS — M79.672 BILATERAL FOOT PAIN: Primary | ICD-10-CM

## 2023-11-14 RX ORDER — METOPROLOL SUCCINATE 50 MG/1
50 TABLET, EXTENDED RELEASE ORAL DAILY
COMMUNITY

## 2023-11-14 RX ORDER — MONTELUKAST SODIUM 10 MG/1
10 TABLET ORAL NIGHTLY
COMMUNITY

## 2023-11-14 RX ORDER — LOSARTAN POTASSIUM 50 MG/1
25 TABLET ORAL DAILY
COMMUNITY

## 2023-11-14 NOTE — PROGRESS NOTES
11/14/2023  Foot and Ankle Surgery - New Patient   Provider: Dr. Ole Cuello DPM  Location: [facility]    Subjective:  Leslee Casper is a 73 y.o. female.     Chief Complaint   Patient presents with    Left Foot - Pain, Numbness    Right Foot - Pain, Numbness       HPI: The patient is a 73-year-old female who presents to the clinic for bilateral foot pain.    She reports she was referred to our office by her primary care clinicians, Dr. Sahnnon and Dr. Horne. She states she has diabetes and experiences numbness in her feet. She notes she has been experiencing this issue for a few months. She reports she experiences intermittent pain in her feet; however, it has not been as severe lately. She states the numbness is worse at the end of the day. She notes she has seen a podiatrist several years ago and had plantar warts removed. She states she is unable to ambulate barefoot. She reports her last A1c was 13 percent in 05/2023. She states she was placed on insulin, glipizide, and metformin by Dr. Horne, and her A1c is currently 6 percent.       No Known Allergies    Past Medical History:   Diagnosis Date    Callus Approximately 6 mo ago    Chronic kidney disease     Stage 1    Diabetes     Gout     History of transfusion     Had a blood transfusion when I was a baby.. don’t remember why    Hypertension     Liver disease     Fatty Liver    Onychomycosis        Past Surgical History:   Procedure Laterality Date    CHOLECYSTECTOMY      HYSTERECTOMY      TONSILLECTOMY         Family History   Problem Relation Age of Onset    Heart disease Father         Passed from heart  attack    Diabetes Maternal Grandmother         Had diabetes I think    Heart disease Paternal Grandfather         Passed from heart attack    Cancer Maternal Aunt         Passed from throat cancer    Thyroid disease Maternal Aunt     Cancer Maternal Aunt         Passed from Cancer    Cancer Sister         Had bone cancer with leg  amputation.. then passed from lung cancer 3 yr later at age 21    Cancer Brother         Had lung cancer that had metastasized through his body .. passed 3 months after dx at the age of 54    Cancer Brother         Had Prostate  Cancer several years ago.. had 48 radiation treatments .. remains .. clear at this time    Heart disease Brother         Had a Heart Attack a few years ago    Hypertension Brother         Being treated for hypertension    Thyroid disease Maternal Aunt         Was on thyroid medicine       Social History     Socioeconomic History    Marital status:    Tobacco Use    Smoking status: Never    Smokeless tobacco: Never   Vaping Use    Vaping Use: Never used   Substance and Sexual Activity    Alcohol use: Never    Drug use: Never    Sexual activity: Not Currently     Partners: Male     Birth control/protection: Post-menopausal, Birth control pill, Hysterectomy        Current Outpatient Medications on File Prior to Visit   Medication Sig Dispense Refill    allopurinol (ZYLOPRIM) 300 MG tablet Take 1 tablet by mouth Daily.      bumetanide (BUMEX) 2 MG tablet Take 1 tablet by mouth Daily.      cloNIDine (CATAPRES) 0.1 MG tablet Take 1 tablet by mouth As Needed for High Blood Pressure. As needed once daily for BP systolic greater than 190 6 tablet 0    ezetimibe (ZETIA) 10 MG tablet Take 1 tablet by mouth Daily.      folic acid (FOLVITE) 1 MG tablet Take 1 tablet by mouth Daily.      insulin lispro protamine-insulin lispro (humaLOG 75-25) (75-25) 100 UNIT/ML suspension injection Inject 10 Units under the skin into the appropriate area as directed 2 (Two) Times a Day With Meals.      losartan (COZAAR) 50 MG tablet Take 0.5 tablets by mouth Daily.      metFORMIN (GLUCOPHAGE) 500 MG tablet Take 1 tablet by mouth 2 (Two) Times a Day With Meals.      metoprolol succinate XL (TOPROL-XL) 50 MG 24 hr tablet Take 1 tablet by mouth Daily.      montelukast (SINGULAIR) 10 MG tablet Take 1 tablet by mouth  "Every Night.      RABEprazole (ACIPHEX) 20 MG EC tablet Take 1 tablet by mouth Daily.      rOPINIRole (REQUIP) 2 MG tablet Take 1 tablet by mouth 3 (Three) Times a Day.      aspirin 81 MG EC tablet Take 1 tablet by mouth 3 (Three) Times a Week. Monday, Wednesday, Friday      Cholecalciferol (VITAMIN D3) 125 MCG (5000 UT) capsule capsule Take 1 capsule by mouth 2 (two) times a day.      exenatide er (BYDUREON) 2 MG/0.85ML auto-injector injection Inject 0.85 mL under the skin into the appropriate area as directed 1 (One) Time Per Week. Tuesday      febuxostat (ULORIC) 80 MG tablet tablet Take  by mouth Daily.      glipizide (GLUCOTROL) 10 MG tablet Take 2 tablets by mouth 2 (Two) Times a Day Before Meals.      metoprolol tartrate (LOPRESSOR) 50 MG tablet Take 1 tablet by mouth 2 (Two) Times a Day.       No current facility-administered medications on file prior to visit.       Review of Systems:  General: Denies fever, chills, fatigue, and weakness.  Eyes: Denies vision loss, blurry vision, and excessive redness.  ENT: Denies hearing issues and difficulty swallowing.  Cardiovascular: Denies palpitations, chest pain, or syncopal episodes.  Respiratory: Denies shortness of breath, wheezing, and coughing.  GI: Denies abdominal pain, nausea, and vomiting.   : Denies frequency, hematuria, and urgency.  Musculoskeletal: Denies muscle cramps, joint pains, and stiffness.  Derm: Denies rash, open wounds, or suspicious lesions.  Neuro: Denies headaches, numbness, loss of coordination, and tremors.  Psych: Denies anxiety and depression.  Endocrine: Denies temperature intolerance and changes in appetite.  Heme: Denies bleeding disorders or abnormal bruising.     Objective   Resp 18   Ht 165.1 cm (65\")   Wt 80.3 kg (177 lb 1.6 oz)   BMI 29.47 kg/m²     Foot/Ankle Exam    GENERAL  Orientation:  AAOx3  Affect:  appropriate    VASCULAR     Right Foot Vascularity   Normal vascular exam    Dorsalis pedis:  2+  Posterior tibial:  " 2+  Skin temperature:  warm  Edema grading:  None  CFT:  < 3 seconds  Pedal hair growth:  Present  Varicosities:  none     Left Foot Vascularity   Normal vascular exam    Dorsalis pedis:  2+  Posterior tibial:  2+  Skin temperature:  warm  Edema grading:  None  CFT:  < 3 seconds  Pedal hair growth:  Present  Varicosities:  none     NEUROLOGIC     Right Foot Neurologic   Light touch sensation: normal  Hot/Cold sensation: normal  Achilles reflex:  2+     Left Foot Neurologic   Light touch sensation: normal  Hot/Cold sensation:  normal  Achilles reflex:  2+    MUSCULOSKELETAL     Right Foot Musculoskeletal   Arch:  Normal     Left Foot Musculoskeletal   Arch:  Normal    MUSCLE STRENGTH     Right Foot Muscle Strength   Normal strength    Foot dorsiflexion:  5  Foot plantar flexion:  5  Foot inversion:  5  Foot eversion:  5     Left Foot Muscle Strength   Normal strength    Foot dorsiflexion:  5  Foot plantar flexion:  5  Foot inversion:  5  Foot eversion:  5    DERMATOLOGIC      Right Foot Dermatologic   Skin  Right foot skin is intact.   Nails comment:  Nails 1-5     Left Foot Dermatologic   Skin  Left foot skin is intact.   Nails comment:  Nails 1-5    TESTS     Right Foot Tests   Anterior drawer: negative  Varus tilt: negative     Left Foot Tests   Anterior drawer: negative  Varus tilt: negative     Right foot additional comments: Moderate soft tissue rigidity and equinus contracture involving both lower extremities. Bony prominence noted to the dorsal midfoot of both feet. Neurovascular status appears to be intact. No open wounds or signs of infection. Nails are thickened, dystrophic x10.       Left foot additional comments: Moderate soft tissue rigidity and equinus contracture involving both lower extremities. Bony prominence noted to the dorsal midfoot of both feet. Neurovascular status appears to be intact. No open wounds or signs of infection. Nails are thickened, dystrophic x10.        Assessment & Plan    Diagnoses and all orders for this visit:    1. Bilateral foot pain (Primary)    2. Metatarsalgia of both feet  -     XR Foot 3+ View Bilateral    3. Acquired posterior equinus of both lower extremities    4. 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 Type 2 diabetes mellitus with hyperglycemia, with long-term current use of insulin        The patient is a 73-year-old female who presents to the office today for evaluation of bilateral foot pain. X-rays of the bilateral feet, taken today, were reviewed with the patient. We discussed the etiology and biomechanics involved with her bilateral foot pain. I explained that her symptoms are likely due to the structure and function of her feet. I recommend over-the-counter arch supports to support the midfoot and a metatarsal pad. I recommend stretching exercises 1 to 3 times daily. We discussed avoidance of ambulating while barefoot. I recommend Aquaphor to hydrate the feet. The patient will return to the office in 2 months for reevaluation. Greater than 30 minutes was spent before, during, and after evaluation for patient care.    Orders Placed This Encounter   Procedures    XR Foot 3+ View Bilateral     Weight Bearing     Order Specific Question:   Reason for Exam:     Answer:   metatarsalgia rm 10 wb     Order Specific Question:   Release to patient     Answer:   Routine Release [[id number]]        Note is dictated utilizing voice recognition software. Unfortunately this leads to occasional typographical errors. I apologize in advance if the situation occurs. If questions occur please do not hesitate to call our office.    Transcribed from ambient dictation for KENN Cuello DPM by Salome Durant  .  11/14/23   16:52 EST    Patient or patient representative verbalized consent to the visit recording.  I have personally performed the services described in this document as transcribed by the above individual, and it is both accurate and complete.